# Patient Record
Sex: MALE | Race: WHITE | Employment: FULL TIME | URBAN - METROPOLITAN AREA
[De-identification: names, ages, dates, MRNs, and addresses within clinical notes are randomized per-mention and may not be internally consistent; named-entity substitution may affect disease eponyms.]

---

## 2020-12-30 ENCOUNTER — HOSPITAL ENCOUNTER (INPATIENT)
Age: 60
LOS: 2 days | Discharge: HOME | DRG: 872 | End: 2021-01-01
Attending: EMERGENCY MEDICINE | Admitting: INTERNAL MEDICINE
Payer: COMMERCIAL

## 2020-12-30 ENCOUNTER — APPOINTMENT (OUTPATIENT)
Dept: GENERAL RADIOLOGY | Age: 60
DRG: 872 | End: 2020-12-30
Payer: COMMERCIAL

## 2020-12-30 DIAGNOSIS — A41.9 SEPTICEMIA (HCC): ICD-10-CM

## 2020-12-30 DIAGNOSIS — L03.115 CELLULITIS OF RIGHT LOWER EXTREMITY: Primary | ICD-10-CM

## 2020-12-30 LAB
ABSOLUTE EOS #: 0 K/UL (ref 0–0.44)
ABSOLUTE IMMATURE GRANULOCYTE: 0.16 K/UL (ref 0–0.3)
ABSOLUTE LYMPH #: 0.49 K/UL (ref 1.1–3.7)
ABSOLUTE MONO #: 0.33 K/UL (ref 0.1–1.2)
ALBUMIN SERPL-MCNC: 3.7 G/DL (ref 3.5–5.2)
ALBUMIN/GLOBULIN RATIO: 1.2 (ref 1–2.5)
ALP BLD-CCNC: 81 U/L (ref 40–129)
ALT SERPL-CCNC: 32 U/L (ref 5–41)
ANION GAP SERPL CALCULATED.3IONS-SCNC: 12 MMOL/L (ref 9–17)
AST SERPL-CCNC: 28 U/L
BASOPHILS # BLD: 0 % (ref 0–2)
BASOPHILS ABSOLUTE: 0 K/UL (ref 0–0.2)
BILIRUB SERPL-MCNC: 0.44 MG/DL (ref 0.3–1.2)
BILIRUBIN DIRECT: 0.18 MG/DL
BILIRUBIN URINE: NEGATIVE
BILIRUBIN, INDIRECT: 0.26 MG/DL (ref 0–1)
BUN BLDV-MCNC: 22 MG/DL (ref 6–20)
BUN/CREAT BLD: ABNORMAL (ref 9–20)
C-REACTIVE PROTEIN: 331.9 MG/L (ref 0–5)
CALCIUM SERPL-MCNC: 9.8 MG/DL (ref 8.6–10.4)
CHLORIDE BLD-SCNC: 96 MMOL/L (ref 98–107)
CO2: 25 MMOL/L (ref 20–31)
COLOR: YELLOW
COMMENT UA: NORMAL
CREAT SERPL-MCNC: 1.28 MG/DL (ref 0.7–1.2)
DIFFERENTIAL TYPE: ABNORMAL
EOSINOPHILS RELATIVE PERCENT: 0 % (ref 1–4)
GFR AFRICAN AMERICAN: >60 ML/MIN
GFR NON-AFRICAN AMERICAN: 58 ML/MIN
GFR SERPL CREATININE-BSD FRML MDRD: ABNORMAL ML/MIN/{1.73_M2}
GFR SERPL CREATININE-BSD FRML MDRD: ABNORMAL ML/MIN/{1.73_M2}
GLOBULIN: NORMAL G/DL (ref 1.5–3.8)
GLUCOSE BLD-MCNC: 105 MG/DL (ref 70–99)
GLUCOSE URINE: NEGATIVE
HCT VFR BLD CALC: 46.1 % (ref 40.7–50.3)
HEMOGLOBIN: 14.8 G/DL (ref 13–17)
IMMATURE GRANULOCYTES: 1 %
INR BLD: 1
KETONES, URINE: NEGATIVE
LACTIC ACID, SEPSIS WHOLE BLOOD: 1.4 MMOL/L (ref 0.5–1.9)
LACTIC ACID, SEPSIS WHOLE BLOOD: 1.4 MMOL/L (ref 0.5–1.9)
LACTIC ACID, SEPSIS: NORMAL MMOL/L (ref 0.5–1.9)
LACTIC ACID, SEPSIS: NORMAL MMOL/L (ref 0.5–1.9)
LEUKOCYTE ESTERASE, URINE: NEGATIVE
LYMPHOCYTES # BLD: 3 % (ref 24–43)
MCH RBC QN AUTO: 29.7 PG (ref 25.2–33.5)
MCHC RBC AUTO-ENTMCNC: 32.1 G/DL (ref 28.4–34.8)
MCV RBC AUTO: 92.4 FL (ref 82.6–102.9)
MONOCYTES # BLD: 2 % (ref 3–12)
MORPHOLOGY: NORMAL
NITRITE, URINE: NEGATIVE
NRBC AUTOMATED: 0 PER 100 WBC
PARTIAL THROMBOPLASTIN TIME: 27 SEC (ref 20.5–30.5)
PDW BLD-RTO: 13.2 % (ref 11.8–14.4)
PH UA: 6 (ref 5–8)
PLATELET # BLD: 261 K/UL (ref 138–453)
PLATELET ESTIMATE: ABNORMAL
PMV BLD AUTO: 8.7 FL (ref 8.1–13.5)
POTASSIUM SERPL-SCNC: 4.8 MMOL/L (ref 3.7–5.3)
PROTEIN UA: NEGATIVE
PROTHROMBIN TIME: 10.6 SEC (ref 9–12)
RBC # BLD: 4.99 M/UL (ref 4.21–5.77)
RBC # BLD: ABNORMAL 10*6/UL
SEDIMENTATION RATE, ERYTHROCYTE: 35 MM (ref 0–20)
SEG NEUTROPHILS: 94 % (ref 36–65)
SEGMENTED NEUTROPHILS ABSOLUTE COUNT: 15.42 K/UL (ref 1.5–8.1)
SODIUM BLD-SCNC: 133 MMOL/L (ref 135–144)
SPECIFIC GRAVITY UA: 1.02 (ref 1–1.03)
TOTAL PROTEIN: 6.7 G/DL (ref 6.4–8.3)
TURBIDITY: CLEAR
URINE HGB: NEGATIVE
UROBILINOGEN, URINE: NORMAL
WBC # BLD: 16.4 K/UL (ref 3.5–11.3)
WBC # BLD: ABNORMAL 10*3/UL

## 2020-12-30 PROCEDURE — 6360000002 HC RX W HCPCS: Performed by: STUDENT IN AN ORGANIZED HEALTH CARE EDUCATION/TRAINING PROGRAM

## 2020-12-30 PROCEDURE — 73590 X-RAY EXAM OF LOWER LEG: CPT

## 2020-12-30 PROCEDURE — 2500000003 HC RX 250 WO HCPCS: Performed by: STUDENT IN AN ORGANIZED HEALTH CARE EDUCATION/TRAINING PROGRAM

## 2020-12-30 PROCEDURE — 2580000003 HC RX 258: Performed by: STUDENT IN AN ORGANIZED HEALTH CARE EDUCATION/TRAINING PROGRAM

## 2020-12-30 PROCEDURE — 85652 RBC SED RATE AUTOMATED: CPT

## 2020-12-30 PROCEDURE — 6370000000 HC RX 637 (ALT 250 FOR IP): Performed by: STUDENT IN AN ORGANIZED HEALTH CARE EDUCATION/TRAINING PROGRAM

## 2020-12-30 PROCEDURE — 93970 EXTREMITY STUDY: CPT

## 2020-12-30 PROCEDURE — 99285 EMERGENCY DEPT VISIT HI MDM: CPT

## 2020-12-30 PROCEDURE — 80076 HEPATIC FUNCTION PANEL: CPT

## 2020-12-30 PROCEDURE — 87040 BLOOD CULTURE FOR BACTERIA: CPT

## 2020-12-30 PROCEDURE — 73600 X-RAY EXAM OF ANKLE: CPT

## 2020-12-30 PROCEDURE — 87086 URINE CULTURE/COLONY COUNT: CPT

## 2020-12-30 PROCEDURE — 2060000000 HC ICU INTERMEDIATE R&B

## 2020-12-30 PROCEDURE — 36415 COLL VENOUS BLD VENIPUNCTURE: CPT

## 2020-12-30 PROCEDURE — 81003 URINALYSIS AUTO W/O SCOPE: CPT

## 2020-12-30 PROCEDURE — 86140 C-REACTIVE PROTEIN: CPT

## 2020-12-30 PROCEDURE — 80048 BASIC METABOLIC PNL TOTAL CA: CPT

## 2020-12-30 PROCEDURE — 85610 PROTHROMBIN TIME: CPT

## 2020-12-30 PROCEDURE — 85025 COMPLETE CBC W/AUTO DIFF WBC: CPT

## 2020-12-30 PROCEDURE — 83605 ASSAY OF LACTIC ACID: CPT

## 2020-12-30 PROCEDURE — 85730 THROMBOPLASTIN TIME PARTIAL: CPT

## 2020-12-30 RX ORDER — EFAVIRENZ, EMTRICITABINE AND TENOFOVIR DISOPROXIL FUMARATE 600; 200; 300 MG/1; MG/1; MG/1
1 TABLET, FILM COATED ORAL NIGHTLY
Status: DISCONTINUED | OUTPATIENT
Start: 2020-12-30 | End: 2021-01-01 | Stop reason: HOSPADM

## 2020-12-30 RX ORDER — SODIUM CHLORIDE, SODIUM LACTATE, POTASSIUM CHLORIDE, CALCIUM CHLORIDE 600; 310; 30; 20 MG/100ML; MG/100ML; MG/100ML; MG/100ML
INJECTION, SOLUTION INTRAVENOUS CONTINUOUS
Status: DISCONTINUED | OUTPATIENT
Start: 2020-12-30 | End: 2021-01-01 | Stop reason: HOSPADM

## 2020-12-30 RX ORDER — DOXYCYCLINE HYCLATE 100 MG
100 TABLET ORAL ONCE
Status: COMPLETED | OUTPATIENT
Start: 2020-12-30 | End: 2020-12-30

## 2020-12-30 RX ORDER — EFAVIRENZ, EMTRICITABINE AND TENOFOVIR DISOPROXIL FUMARATE 600; 200; 300 MG/1; MG/1; MG/1
1 TABLET, FILM COATED ORAL NIGHTLY
COMMUNITY

## 2020-12-30 RX ORDER — PROMETHAZINE HYDROCHLORIDE 12.5 MG/1
12.5 TABLET ORAL EVERY 6 HOURS PRN
Status: DISCONTINUED | OUTPATIENT
Start: 2020-12-30 | End: 2021-01-01 | Stop reason: HOSPADM

## 2020-12-30 RX ORDER — POLYETHYLENE GLYCOL 3350 17 G/17G
17 POWDER, FOR SOLUTION ORAL DAILY PRN
Status: DISCONTINUED | OUTPATIENT
Start: 2020-12-30 | End: 2021-01-01 | Stop reason: HOSPADM

## 2020-12-30 RX ORDER — HEPARIN SODIUM 5000 [USP'U]/ML
5000 INJECTION, SOLUTION INTRAVENOUS; SUBCUTANEOUS EVERY 8 HOURS SCHEDULED
Status: DISCONTINUED | OUTPATIENT
Start: 2020-12-30 | End: 2021-01-01

## 2020-12-30 RX ORDER — AMLODIPINE BESYLATE 5 MG/1
5 TABLET ORAL DAILY
Status: DISCONTINUED | OUTPATIENT
Start: 2020-12-31 | End: 2021-01-01 | Stop reason: HOSPADM

## 2020-12-30 RX ORDER — OXYCODONE HYDROCHLORIDE AND ACETAMINOPHEN 5; 325 MG/1; MG/1
1 TABLET ORAL ONCE
Status: COMPLETED | OUTPATIENT
Start: 2020-12-30 | End: 2020-12-30

## 2020-12-30 RX ORDER — CLINDAMYCIN PHOSPHATE 600 MG/50ML
600 INJECTION INTRAVENOUS ONCE
Status: COMPLETED | OUTPATIENT
Start: 2020-12-30 | End: 2020-12-30

## 2020-12-30 RX ORDER — METOPROLOL TARTRATE 5 MG/5ML
5 INJECTION INTRAVENOUS ONCE
Status: COMPLETED | OUTPATIENT
Start: 2020-12-30 | End: 2020-12-30

## 2020-12-30 RX ORDER — METOPROLOL TARTRATE 50 MG/1
50 TABLET, FILM COATED ORAL 2 TIMES DAILY
COMMUNITY

## 2020-12-30 RX ORDER — ONDANSETRON 2 MG/ML
4 INJECTION INTRAMUSCULAR; INTRAVENOUS EVERY 6 HOURS PRN
Status: DISCONTINUED | OUTPATIENT
Start: 2020-12-30 | End: 2021-01-01 | Stop reason: HOSPADM

## 2020-12-30 RX ORDER — AMLODIPINE BESYLATE 5 MG/1
5 TABLET ORAL DAILY
COMMUNITY

## 2020-12-30 RX ORDER — SODIUM CHLORIDE 0.9 % (FLUSH) 0.9 %
10 SYRINGE (ML) INJECTION EVERY 12 HOURS SCHEDULED
Status: DISCONTINUED | OUTPATIENT
Start: 2020-12-30 | End: 2021-01-01 | Stop reason: HOSPADM

## 2020-12-30 RX ORDER — SODIUM CHLORIDE 0.9 % (FLUSH) 0.9 %
10 SYRINGE (ML) INJECTION PRN
Status: DISCONTINUED | OUTPATIENT
Start: 2020-12-30 | End: 2021-01-01 | Stop reason: HOSPADM

## 2020-12-30 RX ORDER — ATORVASTATIN CALCIUM 10 MG/1
10 TABLET, FILM COATED ORAL DAILY
Status: DISCONTINUED | OUTPATIENT
Start: 2020-12-31 | End: 2021-01-01 | Stop reason: HOSPADM

## 2020-12-30 RX ORDER — ACETAMINOPHEN 325 MG/1
650 TABLET ORAL EVERY 6 HOURS PRN
Status: DISCONTINUED | OUTPATIENT
Start: 2020-12-30 | End: 2021-01-01 | Stop reason: HOSPADM

## 2020-12-30 RX ORDER — CLINDAMYCIN PHOSPHATE 600 MG/50ML
600 INJECTION INTRAVENOUS EVERY 8 HOURS
Status: DISCONTINUED | OUTPATIENT
Start: 2020-12-30 | End: 2021-01-01 | Stop reason: HOSPADM

## 2020-12-30 RX ORDER — ACETAMINOPHEN 650 MG/1
650 SUPPOSITORY RECTAL EVERY 6 HOURS PRN
Status: DISCONTINUED | OUTPATIENT
Start: 2020-12-30 | End: 2021-01-01 | Stop reason: HOSPADM

## 2020-12-30 RX ADMIN — METOPROLOL TARTRATE 5 MG: 5 INJECTION, SOLUTION INTRAVENOUS at 18:28

## 2020-12-30 RX ADMIN — SODIUM CHLORIDE, POTASSIUM CHLORIDE, SODIUM LACTATE AND CALCIUM CHLORIDE: 600; 310; 30; 20 INJECTION, SOLUTION INTRAVENOUS at 11:42

## 2020-12-30 RX ADMIN — CLINDAMYCIN PHOSPHATE 600 MG: 600 INJECTION, SOLUTION INTRAVENOUS at 08:42

## 2020-12-30 RX ADMIN — ACETAMINOPHEN 650 MG: 325 TABLET ORAL at 18:04

## 2020-12-30 RX ADMIN — HEPARIN SODIUM 5000 UNITS: 5000 INJECTION INTRAVENOUS; SUBCUTANEOUS at 22:16

## 2020-12-30 RX ADMIN — EFAVIRENZ, EMTRICITABINE, AND TENOFOVIR DISOPROXIL FUMARATE 1 TABLET: 600; 200; 300 TABLET, FILM COATED ORAL at 22:16

## 2020-12-30 RX ADMIN — OXYCODONE HYDROCHLORIDE AND ACETAMINOPHEN 1 TABLET: 5; 325 TABLET ORAL at 06:50

## 2020-12-30 RX ADMIN — SODIUM CHLORIDE, PRESERVATIVE FREE 10 ML: 5 INJECTION INTRAVENOUS at 11:44

## 2020-12-30 RX ADMIN — HEPARIN SODIUM 5000 UNITS: 5000 INJECTION INTRAVENOUS; SUBCUTANEOUS at 16:15

## 2020-12-30 RX ADMIN — SODIUM CHLORIDE, POTASSIUM CHLORIDE, SODIUM LACTATE AND CALCIUM CHLORIDE: 600; 310; 30; 20 INJECTION, SOLUTION INTRAVENOUS at 21:00

## 2020-12-30 RX ADMIN — CLINDAMYCIN IN 5 PERCENT DEXTROSE 600 MG: 12 INJECTION, SOLUTION INTRAVENOUS at 14:01

## 2020-12-30 RX ADMIN — CLINDAMYCIN IN 5 PERCENT DEXTROSE 600 MG: 12 INJECTION, SOLUTION INTRAVENOUS at 21:31

## 2020-12-30 RX ADMIN — VANCOMYCIN HYDROCHLORIDE 1750 MG: 10 INJECTION, POWDER, LYOPHILIZED, FOR SOLUTION INTRAVENOUS at 10:45

## 2020-12-30 RX ADMIN — DOXYCYCLINE HYCLATE 100 MG: 100 TABLET, COATED ORAL at 06:35

## 2020-12-30 RX ADMIN — SODIUM CHLORIDE, PRESERVATIVE FREE 10 ML: 5 INJECTION INTRAVENOUS at 21:31

## 2020-12-30 ASSESSMENT — PAIN SCALES - GENERAL
PAINLEVEL_OUTOF10: 0
PAINLEVEL_OUTOF10: 0
PAINLEVEL_OUTOF10: 7
PAINLEVEL_OUTOF10: 4

## 2020-12-30 ASSESSMENT — PAIN DESCRIPTION - PAIN TYPE: TYPE: ACUTE PAIN

## 2020-12-30 ASSESSMENT — ENCOUNTER SYMPTOMS
ABDOMINAL PAIN: 0
WHEEZING: 0
SHORTNESS OF BREATH: 0
VOMITING: 0
COUGH: 0
CHEST TIGHTNESS: 0
NAUSEA: 0

## 2020-12-30 ASSESSMENT — PAIN DESCRIPTION - LOCATION: LOCATION: LEG

## 2020-12-30 NOTE — ED PROVIDER NOTES
[facility]  ED  Emergency Department Encounter  Emergency Medicine Resident     Pt Name: Phil Muller  MRN: [id number]  [place] 1960   Date of evaluation: 12/30/20  PCP:  Paulo Lozada MD    [address] [place] [address]       Chief Complaint   Patient presents with    Leg Swelling     RLE/ redness to RLE       HISTORY OFPRESENT ILLNESS  (Location/Symptom, Timing/Onset, Context/Setting, Quality, Duration, Modifying [place].)      Phil Muller is a 63 yo male who presents with right leg swelling, redness, warmth. He notes a history of cellulitis in that leg before. He states that his legs been swollen for the past day or so. Denies any fevers, chills, sweats, cough or difficulty breathing, abdominal pain, nausea, vomiting. He denies any history of blood clots in the past, hemoptysis, recent surgery or long travel. Denies any history of diabetes or abrasions or cuts to the leg. He notes it is painful and hard and the back of his calf. PAST MEDICAL / SURGICAL / SOCIAL / FAMILY HISTORY      has a past medical history of HIV disease ([address] and Hypertension. Cellulitis     has no past surgical history on file.  Denies    Social History     Socioeconomic History    Marital status: Single     Spouse name: Not on file    Number of children: Not on file    Years of education: Not on file    Highest education level: Not on file   Occupational History    Not on file   Social Needs    Financial resource strain: Not on file    Food insecurity     Worry: Not on file     Inability: Not on file    Transportation needs     Medical: Not on file     Non-medical: Not on file   Tobacco Use    Smoking status: Never Smoker    Smokeless tobacco: Never Used   Substance and Sexual Activity    Alcohol use: Not on file     Comment: socially    Drug use: Never    Sexual activity: Not on file   Lifestyle    Physical activity     Days per week: Not on file     Minutes per session: Not on file    Stress: Not on file   Relationships    Social connections     Talks on phone: Not on file     Gets together: Not on file     Attends [employer] service: Not on file     Active member of club or organization: Not on file     Attends meetings of clubs or organizations: Not on file     Relationship status: Not on file    Intimate partner violence     Fear of current or ex partner: Not on file     Emotionally abused: Not on file     Physically abused: Not on file     Forced sexual activity: Not on file   Other Topics Concern    Not on file   Social History Narrative    Not on file       History reviewed. No pertinent family history. Allergies:  Patient has no known allergies. Home Medications:  Prior to Admission medications    Medication Sig Start Date End Date Taking? Authorizing Provider   LOVASTATIN PO Take by mouth   Yes Historical Provider, MD       REVIEW OFSYSTEMS    (2-9 systems for level 4, 10 or more for level 5)      Review of Systems   Constitutional: Negative for chills and fever. HENT: Negative. Eyes: Negative for visual disturbance. Respiratory: Negative for cough, chest tightness, shortness of breath and wheezing. Cardiovascular: Negative for chest pain and palpitations. Right leg swelling. Gastrointestinal: Negative for abdominal pain, nausea and vomiting. Skin: Positive for rash. Negative for wound. Neurological: Negative for syncope, weakness, light-headedness and numbness. PHYSICAL EXAM   (up to 7 for level 4, 8 or more forlevel 5)      INITIAL VITALS:   ED Triage Vitals   BP Temp Temp Source Pulse Resp SpO2 Height Weight   12/30/20 0628 12/30/20 0620 12/30/20 0620 12/30/20 0628 12/30/20 0628 12/30/20 0628 -- --   138/79 98.2 °F (36.8 °C) Temporal 98 20 100 %         Physical Exam  Vitals signs and nursing note reviewed. Constitutional:       General: He is not in acute distress. Appearance: Normal appearance.  He is not ill-appearing, toxic-appearing or diaphoretic. Cardiovascular:      Rate and Rhythm: Regular rhythm. Tachycardia present. Pulses: Normal pulses. Heart sounds: No murmur. No gallop. Pulmonary:      Effort: Pulmonary effort is normal.      Breath sounds: Normal breath sounds. Abdominal:      General: There is no distension. Palpations: Abdomen is soft. Tenderness: There is no abdominal tenderness. There is no guarding. Musculoskeletal:      Comments: Left leg normal.  Right lower extremity with swelling, erythema, warmth calf and popliteal tenderness with a tight but compressible compartment. Neurological:      Mental Status: He is alert. Comments: Right lower extremity is neurovascular intact.          DIFFERENTIAL  DIAGNOSIS     PLAN (LABS / IMAGING / EKG):  Orders Placed This Encounter   Procedures    Culture, Blood 1    Culture, Blood 1    Culture, Blood 1    Culture, Blood 2    Culture, Urine    XR ANKLE RIGHT (2 VIEWS)    XR TIBIA FIBULA RIGHT (2 VIEWS)    VL DUP LOWER EXTREMITY VENOUS BILATERAL    CBC WITH AUTO DIFFERENTIAL    BASIC METABOLIC PANEL    C-REACTIVE PROTEIN    Sedimentation Rate    Lactate, Sepsis    Protime-INR    APTT    HEPATIC FUNCTION PANEL    Basic Metabolic Panel w/ Reflex to MG    CBC auto differential    URINALYSIS    DIET GENERAL;    Height and weight    Telemetry Monitoring    Vital signs per unit routine    Notify physician    Up as tolerated    Daily weights    Intake and output    Elevate Head of Bed     Strict intake and output    Misc nursing order (specify)    Full Code    Inpatient consult to Internal Medicine    Inpatient consult to Case Management    OT eval and treat    PT evaluation and treat    Initiate Oxygen Therapy Protocol    PATIENT STATUS (FROM ED OR OR/PROCEDURAL) Inpatient       MEDICATIONS ORDERED:  Orders Placed This Encounter   Medications    doxycycline hyclate (VIBRA-TABS) tablet 100 mg     Order Specific Question: Antimicrobial Indications     Answer: Other     Order Specific Question:   Other Abx Indication     Answer:   cellulitis    oxyCODONE-acetaminophen (PERCOCET) 5-325 MG per tablet 1 tablet    DISCONTD: vancomycin (VANCOCIN) 1750 mg in dextrose 5% 500 mL IVPB     Order Specific Question:   Antimicrobial Indications     Answer: Other     Order Specific Question:   Other Abx Indication     Answer:   Cellulitis    clindamycin (CLEOCIN) 600 mg in dextrose 5 % 50 mL IVPB     Order Specific Question:   Antimicrobial Indications     Answer: Other     Order Specific Question:   Other Abx Indication     Answer:   Cellulitis    sodium chloride flush 0.9 % injection 10 mL    sodium chloride flush 0.9 % injection 10 mL    OR Linked Order Group     promethazine (PHENERGAN) tablet 12.5 mg     ondansetron (ZOFRAN) injection 4 mg    polyethylene glycol (GLYCOLAX) packet 17 g    OR Linked Order Group     acetaminophen (TYLENOL) tablet 650 mg     acetaminophen (TYLENOL) suppository 650 mg    heparin (porcine) injection 5,000 Units    clindamycin (CLEOCIN) 600 mg in dextrose 5 % 50 mL IVPB     Order Specific Question:   Antimicrobial Indications     Answer:   Skin and Soft Tissue Infection    lactated ringers infusion       DDX: DVT, cellulitis, sepsis    Initial MDM/Plan/ED course: 62 y.o. male who presents with right lower extremity swelling, redness, warmth. On exam patient is mildly tachycardic otherwise vitals normal patient is in no acute distress. Physical exam reveals a erythematous, warm, swollen and tender right lower extremity up to the knee. There is induration and firmness of the calf however the compartment is still compressible. Basic labs as well as CRP and ESR were obtained. Plain films of the leg and venous Doppler studies of the leg were obtained. Lab work showing leukocytosis and elevation in inflammatory markers consistent with cellulitis.   Patient does meet criteria for sepsis and would like to stay for IV antibiotics at this time. He does note a history of HIV but states that his viral load has been undetectable for the past few years. Patient started on IV antibiotics and admitted to internal medicine. DIAGNOSTIC RESULTS / EMERGENCY DEPARTMENT COURSE / MDM     LABS:  Labs Reviewed   CBC WITH AUTO DIFFERENTIAL - Abnormal; Notable for the following components:       Result Value    WBC 16.4 (*)     Immature Granulocytes 1 (*)     Seg Neutrophils 94 (*)     Lymphocytes 3 (*)     Monocytes 2 (*)     Eosinophils % 0 (*)     Segs Absolute 15.42 (*)     Absolute Lymph # 0.49 (*)     All other components within normal limits   BASIC METABOLIC PANEL - Abnormal; Notable for the following components:    Glucose 105 (*)     BUN 22 (*)     CREATININE 1.28 (*)     Sodium 133 (*)     Chloride 96 (*)     GFR Non- 58 (*)     All other components within normal limits   C-REACTIVE PROTEIN - Abnormal; Notable for the following components:    .9 (*)     All other components within normal limits   SEDIMENTATION RATE - Abnormal; Notable for the following components:    Sed Rate 35 (*)     All other components within normal limits   CULTURE, BLOOD 1   CULTURE, BLOOD 1   CULTURE, BLOOD 1   CULTURE, BLOOD 2   CULTURE, URINE   LACTATE, SEPSIS   LACTATE, SEPSIS   PROTIME-INR   APTT   HEPATIC FUNCTION PANEL   URINALYSIS         RADIOLOGY:  Xr Tibia Fibula Right (2 Views)    Result Date: 12/30/2020  EXAMINATION: 2 XRAY VIEWS OF THE RIGHT ANKLE; 2 XRAY VIEWS OF THE RIGHT TIBIA AND FIBULA 12/30/2020 7:05 am COMPARISON: None. HISTORY: ORDERING SYSTEM PROVIDED HISTORY: poss infection TECHNOLOGIST PROVIDED HISTORY: poss infection Reason for Exam: c/o swelling, redness right foot & lower leg x couple days; denies injury hx prior cellulitis Type of Exam: Initial FINDINGS: There is normal alignment of the bones at the knee and ankle. Bone mineralization normal.  No acute fracture.   No abnormal periosteal reaction. No lytic or blastic lesions. There is a diffuse soft tissue edema of the lower leg. This can be seen in cellulitis. Please correlate with physical exam.     No radiographic evidence for acute osseous abnormality or infection. Diffuse edema of the lower lytic suggestive of cellulitis. Please correlate with physical exam.     Xr Ankle Right (2 Views)    Result Date: 12/30/2020  EXAMINATION: 2 XRAY VIEWS OF THE RIGHT ANKLE; 2 XRAY VIEWS OF THE RIGHT TIBIA AND FIBULA 12/30/2020 7:05 am COMPARISON: None. HISTORY: ORDERING SYSTEM PROVIDED HISTORY: poss infection TECHNOLOGIST PROVIDED HISTORY: poss infection Reason for Exam: c/o swelling, redness right foot & lower leg x couple days; denies injury hx prior cellulitis Type of Exam: Initial FINDINGS: There is normal alignment of the bones at the knee and ankle. Bone mineralization normal.  No acute fracture. No abnormal periosteal reaction. No lytic or blastic lesions. There is a diffuse soft tissue edema of the lower leg. This can be seen in cellulitis. Please correlate with physical exam.     No radiographic evidence for acute osseous abnormality or infection. Diffuse edema of the lower lytic suggestive of cellulitis. Please correlate with physical exam.         EKG      All EKG's are interpreted by the [facility] Physician who either signs or Co-signs this chart in the absence of a cardiologist.      PROCEDURES:  None    CONSULTS:  IP CONSULT TO INTERNAL MEDICINE  IP CONSULT TO CASE MANAGEMENT    CRITICAL CARE:  Please see attending note    FINAL IMPRESSION      1. Cellulitis of right lower extremity    2. Septicemia ([address]          DISPOSITION / PLAN     DISPOSITION Admitted 12/30/2020 08:33:28 AM      PATIENT REFERRED TO:  No follow-up provider specified.     DISCHARGE MEDICATIONS:  New Prescriptions    No medications on file       Iris Johnson DO  Emergency Medicine Resident    (Please note that portions of this note were completed with a voice recognition program.Efforts were made to edit the dictations but occasionally words are mis-transcribed.)        Ghassan Zamora DO  Resident  12/30/20 2376

## 2020-12-30 NOTE — ED NOTES
Pt transported to vascular lab     Danilo Mindy, [address] [place] [address]  12/30/20 7245

## 2020-12-30 NOTE — CARE COORDINATION
Case Management Initial Discharge Plan  Ayad Klein,             Met with:patient to discuss discharge plans. Information verified: address, contacts, phone number, , insurance Yes    Emergency Contact/Next of Kin name & number:   Marija Fernandez    Other    [phone]         PCP: Anabella Price MD  Date of last visit: 3 months ago    Insurance Provider: [employer]    Discharge Planning    Living Arrangements:  Friends   Support Systems:  Friends/Neighbors    Home has 2 stories  3 stairs to climb to get into front door, 15stairs to climb to reach second floor  Location of bedroom/bathroom in home main    Patient able to perform ADL's:Independent    Current Services (outpatient & in home) none  DME equipment: none  DME provider: none    Receiving oral anticoagulation therapy? No    If indicated:   Physician managing anticoagulation treatment: n/a  Where does patient obtain lab work for ATC treatment? n/a      Potential Assistance Needed:  N/A    Patient agreeable to home care: No  [place] of choice provided:  n/a    Prior SNF/Rehab Placement and Facility: none  Agreeable to SNF/Rehab: No  [place] of choice provided: n/a     Evaluation: no    Expected Discharge date:       Patient expects to be discharged to:  home  Follow Up Appointment: Best Day/ Time:      Transportation provider: self  Transportation arrangements needed for discharge: No    Readmission Risk              Risk of Unplanned Readmission:        7             Does patient have a readmission risk score greater than 14?: No  If yes, follow-up appointment must be made within 7 days of discharge.      Goals of Care: to go home      Discharge Plan: Home independently pcp established has [facility]          Electronically signed by Julián Glez RN on 20 at 9:26 AM EST

## 2020-12-30 NOTE — ED PROVIDER NOTES
[facility]     Emergency Department     Faculty Attestation    I performed a history and physical examination of the patient and discussed management with the resident. I have reviewed and agree with the residents findings including all diagnostic interpretations, and treatment plans as written. Any areas of disagreement are noted on the chart. I was personally present for the key portions of any procedures. I have documented in the chart those procedures where I was not present during the key portions. I have reviewed the emergency nurses triage note. I agree with the chief complaint, past medical history, past surgical history, allergies, medications, social and family history as documented unless otherwise noted below. Documentation of the HPI, Physical Exam and Medical Decision Making performed by maiibдмитрий is based on my personal performance of the HPI, PE and MDM. For Physician Assistant/ Nurse Practitioner cases/documentation I have personally evaluated this patient and have completed at least one if not all key elements of the E/M (history, physical exam, and MDM). Additional findings are as noted. 63 yo M r leg swelling redness, no fever, no vomit, no cp or sob,   pe vss gcs 15, 3+ edema r calf / shin / foot, + calf tenderness, compartments firm, warm to touch,     Lab / xr / venous doppler ordered, > care turned over to day shift    EKG Interpretation    Interpreted by me      CRITICAL CARE: There was a high probability of clinically significant/life threatening deterioration in this patient's condition which required my urgent intervention. Total critical care time was 5 minutes. This excludes any time for separately reportable procedures.        Bethany 24, DO  12/30/20 17 Ethel Pierce, DO  12/30/20 0147

## 2020-12-30 NOTE — ED NOTES
Pt resting comfortably in bed. IV antibiotics started. Pt denies any complaints or needs at this time.       Malaika Ortiz RN  12/30/20 8394

## 2020-12-30 NOTE — ED NOTES
Resident notified of pt heart rate increase and temperature increase.       Missael Gibson RN  12/30/20 7975

## 2020-12-30 NOTE — ED PROVIDER NOTES
[facility]  FACULTY HANDOFF       Handoff taken on the following patient from prior Attending Physician: Dr. Mehdi Flores  Pt Name: Brijesh Venegas  PCP:  Francisco Ann MD    Attestation  I was available and discussed any additional care issues that arose and coordinated the management plans with the resident(s) caring for the patient during my duty period. Any areas of disagreement with resident's documentation of care or procedures are noted on the chart. I was personally present for the key portions of any/all procedures during my duty period. I have documented in the chart those procedures where I was not present during the key portions. CHIEF COMPLAINT       Chief Complaint   Patient presents with    Leg Swelling     RLE/ redness to RLE         CURRENT MEDICATIONS     Previous Medications  Previous Medications    No medications on file       Encounter Medications  Orders Placed This Encounter   Medications    doxycycline hyclate (VIBRA-TABS) tablet 100 mg     Order Specific Question:   Antimicrobial Indications     Answer: Other     Order Specific Question:   Other Abx Indication     Answer:   cellulitis    oxyCODONE-acetaminophen (PERCOCET) 5-325 MG per tablet 1 tablet    vancomycin (VANCOCIN) 15 mg/kg in dextrose 5 % 250 mL IVPB     Order Specific Question:   Antimicrobial Indications     Answer: Other     Order Specific Question:   Other Abx Indication     Answer:   Cellulitis    clindamycin (CLEOCIN) 600 mg in dextrose 5 % 50 mL IVPB     Order Specific Question:   Antimicrobial Indications     Answer: Other     Order Specific Question:   Other Abx Indication     Answer:   Cellulitis       ALLERGIES     has No Known Allergies. RECENT VITALS:   Temp: 98.2 °F (36.8 °C),  Pulse: 98, Resp: 20, BP: 138/79    RADIOLOGY:   XR ANKLE RIGHT (2 VIEWS)   Final Result   No radiographic evidence for acute osseous abnormality or infection.   Diffuse   edema of the lower lytic suggestive of cellulitis. Please correlate with   physical exam.         XR TIBIA FIBULA RIGHT (2 VIEWS)   Final Result   No radiographic evidence for acute osseous abnormality or infection. Diffuse   edema of the lower lytic suggestive of cellulitis. Please correlate with   physical exam.         VL DUP LOWER EXTREMITY VENOUS BILATERAL    (Results Pending)       LABS:  Labs Reviewed   CBC WITH AUTO DIFFERENTIAL - Abnormal; Notable for the following components:       Result Value    WBC 16.4 (*)     Immature Granulocytes 1 (*)     Seg Neutrophils 94 (*)     Lymphocytes 3 (*)     Monocytes 2 (*)     Eosinophils % 0 (*)     Segs Absolute 15.42 (*)     Absolute Lymph # 0.49 (*)     All other components within normal limits   BASIC METABOLIC PANEL - Abnormal; Notable for the following components:    Glucose 105 (*)     BUN 22 (*)     CREATININE 1.28 (*)     Sodium 133 (*)     Chloride 96 (*)     GFR Non- 58 (*)     All other components within normal limits   C-REACTIVE PROTEIN - Abnormal; Notable for the following components:    .9 (*)     All other components within normal limits   SEDIMENTATION RATE - Abnormal; Notable for the following components:    Sed Rate 35 (*)     All other components within normal limits   CULTURE, BLOOD 1   CULTURE, BLOOD 1   CULTURE, URINE   LACTATE, SEPSIS   LACTATE, SEPSIS   PROTIME-INR   APTT   URINALYSIS WITH MICROSCOPIC   HEPATIC FUNCTION PANEL   POC BLOOD GAS AND CHEMISTRY           PLAN/ TASKS OUTSTANDING     Pt with leg swelling and cellulitis. Pt has had this in the past. Plan for labs, dvt screening. Admit if labs concerning for IV antibiotics.        (Please note that portions of this note were completed with a voice recognition program.  Efforts were made to edit the dictations but occasionally words are mis-transcribed.)    Elidia Pettit MD,   Attending Emergency Physician       Elidia Pettit MD  12/30/20 5409

## 2020-12-30 NOTE — LETTER
[address] [address] [address] [place] [address]  Phone: [phone]    No name on file. January 1, 2021     Patient: Kieth Cardenas   YOB: 1960   Date of Visit: 12/30/2020       To Whom it May Concern:    Keith Cardenas was seen in our hospital on 12/30/2020. He may return to work on monday(12/04/2020). If you have any questions or concerns, please don't hesitate to call.     Sincerely,   Ana Peña  Internal Medicine Resident  [facility]  1/1/2021 1:24 PM

## 2020-12-30 NOTE — LETTER
[address]  [address] [address]  Phone: [phone]    No name on file. January 1, 2021     Patient: Shantell Bartlett   YOB: 1960   Date of Visit: 12/30/2020       To Whom it May Concern:    Shantell Bartlett was seen in my clinic on 12/30/2020. He may return to work on 12/04/2020. If you have any questions or concerns, please don't hesitate to call.     Sincerely,   Karon Reyes  Internal Medicine Resident, PGY [facility]  1/1/2021 1:30 PM

## 2020-12-30 NOTE — ED NOTES
Patient arrived to ED alert and oriented x4  Patient has complaints of RLE swelling/ redness- Patient states this started yesterday afternoon  Patient states 2 years ago he had something similar happen and it was cellulitis  Patient states he has constant pain rated 7/10, states pain is worse when walking on leg  Dr. Jesusita Nava at bedside      [facility]  12/30/20 9477

## 2020-12-31 PROBLEM — E66.01 MORBID OBESITY DUE TO EXCESS CALORIES (HCC): Status: ACTIVE | Noted: 2020-12-31

## 2020-12-31 PROBLEM — L03.115 CELLULITIS OF RIGHT LEG: Status: ACTIVE | Noted: 2020-12-31

## 2020-12-31 LAB
ABSOLUTE EOS #: <0.03 K/UL (ref 0–0.44)
ABSOLUTE IMMATURE GRANULOCYTE: 0.1 K/UL (ref 0–0.3)
ABSOLUTE LYMPH #: 0.71 K/UL (ref 1.1–3.7)
ABSOLUTE MONO #: 0.63 K/UL (ref 0.1–1.2)
ANION GAP SERPL CALCULATED.3IONS-SCNC: 11 MMOL/L (ref 9–17)
BASOPHILS # BLD: 0 % (ref 0–2)
BASOPHILS ABSOLUTE: <0.03 K/UL (ref 0–0.2)
BUN BLDV-MCNC: 13 MG/DL (ref 6–20)
BUN/CREAT BLD: ABNORMAL (ref 9–20)
CALCIUM SERPL-MCNC: 9.1 MG/DL (ref 8.6–10.4)
CHLORIDE BLD-SCNC: 100 MMOL/L (ref 98–107)
CO2: 25 MMOL/L (ref 20–31)
CREAT SERPL-MCNC: 0.89 MG/DL (ref 0.7–1.2)
CULTURE: NO GROWTH
DIFFERENTIAL TYPE: ABNORMAL
EOSINOPHILS RELATIVE PERCENT: 0 % (ref 1–4)
GFR AFRICAN AMERICAN: >60 ML/MIN
GFR NON-AFRICAN AMERICAN: >60 ML/MIN
GFR SERPL CREATININE-BSD FRML MDRD: ABNORMAL ML/MIN/{1.73_M2}
GFR SERPL CREATININE-BSD FRML MDRD: ABNORMAL ML/MIN/{1.73_M2}
GLUCOSE BLD-MCNC: 106 MG/DL (ref 70–99)
HCT VFR BLD CALC: 42.1 % (ref 40.7–50.3)
HEMOGLOBIN: 13 G/DL (ref 13–17)
IMMATURE GRANULOCYTES: 1 %
LYMPHOCYTES # BLD: 6 % (ref 24–43)
Lab: NORMAL
MCH RBC QN AUTO: 29.3 PG (ref 25.2–33.5)
MCHC RBC AUTO-ENTMCNC: 30.9 G/DL (ref 28.4–34.8)
MCV RBC AUTO: 95 FL (ref 82.6–102.9)
MONOCYTES # BLD: 5 % (ref 3–12)
NRBC AUTOMATED: 0 PER 100 WBC
PDW BLD-RTO: 13.5 % (ref 11.8–14.4)
PLATELET # BLD: 210 K/UL (ref 138–453)
PLATELET ESTIMATE: ABNORMAL
PMV BLD AUTO: 8.8 FL (ref 8.1–13.5)
POTASSIUM SERPL-SCNC: 4.4 MMOL/L (ref 3.7–5.3)
RBC # BLD: 4.43 M/UL (ref 4.21–5.77)
RBC # BLD: ABNORMAL 10*6/UL
SEG NEUTROPHILS: 88 % (ref 36–65)
SEGMENTED NEUTROPHILS ABSOLUTE COUNT: 11.14 K/UL (ref 1.5–8.1)
SODIUM BLD-SCNC: 136 MMOL/L (ref 135–144)
SPECIMEN DESCRIPTION: NORMAL
WBC # BLD: 12.6 K/UL (ref 3.5–11.3)
WBC # BLD: ABNORMAL 10*3/UL

## 2020-12-31 PROCEDURE — 97166 OT EVAL MOD COMPLEX 45 MIN: CPT

## 2020-12-31 PROCEDURE — 85025 COMPLETE CBC W/AUTO DIFF WBC: CPT

## 2020-12-31 PROCEDURE — 6360000002 HC RX W HCPCS: Performed by: STUDENT IN AN ORGANIZED HEALTH CARE EDUCATION/TRAINING PROGRAM

## 2020-12-31 PROCEDURE — 2580000003 HC RX 258: Performed by: STUDENT IN AN ORGANIZED HEALTH CARE EDUCATION/TRAINING PROGRAM

## 2020-12-31 PROCEDURE — 97162 PT EVAL MOD COMPLEX 30 MIN: CPT

## 2020-12-31 PROCEDURE — 2500000003 HC RX 250 WO HCPCS: Performed by: STUDENT IN AN ORGANIZED HEALTH CARE EDUCATION/TRAINING PROGRAM

## 2020-12-31 PROCEDURE — 80048 BASIC METABOLIC PNL TOTAL CA: CPT

## 2020-12-31 PROCEDURE — 99223 1ST HOSP IP/OBS HIGH 75: CPT | Performed by: INTERNAL MEDICINE

## 2020-12-31 PROCEDURE — 6370000000 HC RX 637 (ALT 250 FOR IP): Performed by: STUDENT IN AN ORGANIZED HEALTH CARE EDUCATION/TRAINING PROGRAM

## 2020-12-31 PROCEDURE — 97530 THERAPEUTIC ACTIVITIES: CPT

## 2020-12-31 PROCEDURE — 94660 CPAP INITIATION&MGMT: CPT

## 2020-12-31 PROCEDURE — 2060000000 HC ICU INTERMEDIATE R&B

## 2020-12-31 PROCEDURE — 97535 SELF CARE MNGMENT TRAINING: CPT

## 2020-12-31 PROCEDURE — 36415 COLL VENOUS BLD VENIPUNCTURE: CPT

## 2020-12-31 RX ADMIN — ACETAMINOPHEN 650 MG: 325 TABLET ORAL at 12:25

## 2020-12-31 RX ADMIN — CLINDAMYCIN IN 5 PERCENT DEXTROSE 600 MG: 12 INJECTION, SOLUTION INTRAVENOUS at 15:05

## 2020-12-31 RX ADMIN — CLINDAMYCIN IN 5 PERCENT DEXTROSE 600 MG: 12 INJECTION, SOLUTION INTRAVENOUS at 05:49

## 2020-12-31 RX ADMIN — SODIUM CHLORIDE, PRESERVATIVE FREE 10 ML: 5 INJECTION INTRAVENOUS at 08:04

## 2020-12-31 RX ADMIN — HEPARIN SODIUM 5000 UNITS: 5000 INJECTION INTRAVENOUS; SUBCUTANEOUS at 21:08

## 2020-12-31 RX ADMIN — METOPROLOL TARTRATE 25 MG: 25 TABLET ORAL at 04:23

## 2020-12-31 RX ADMIN — ATORVASTATIN CALCIUM 10 MG: 10 TABLET, FILM COATED ORAL at 08:03

## 2020-12-31 RX ADMIN — EFAVIRENZ, EMTRICITABINE, AND TENOFOVIR DISOPROXIL FUMARATE 1 TABLET: 600; 200; 300 TABLET, FILM COATED ORAL at 21:08

## 2020-12-31 RX ADMIN — HEPARIN SODIUM 5000 UNITS: 5000 INJECTION INTRAVENOUS; SUBCUTANEOUS at 05:50

## 2020-12-31 RX ADMIN — HEPARIN SODIUM 5000 UNITS: 5000 INJECTION INTRAVENOUS; SUBCUTANEOUS at 15:04

## 2020-12-31 RX ADMIN — ACETAMINOPHEN 650 MG: 325 TABLET ORAL at 07:08

## 2020-12-31 RX ADMIN — CLINDAMYCIN IN 5 PERCENT DEXTROSE 600 MG: 12 INJECTION, SOLUTION INTRAVENOUS at 21:08

## 2020-12-31 RX ADMIN — ACETAMINOPHEN 650 MG: 325 TABLET ORAL at 21:08

## 2020-12-31 RX ADMIN — AMLODIPINE BESYLATE 5 MG: 5 TABLET ORAL at 08:03

## 2020-12-31 RX ADMIN — SODIUM CHLORIDE, PRESERVATIVE FREE 10 ML: 5 INJECTION INTRAVENOUS at 21:08

## 2020-12-31 RX ADMIN — ACETAMINOPHEN 650 MG: 325 TABLET ORAL at 00:13

## 2020-12-31 ASSESSMENT — PAIN DESCRIPTION - LOCATION: LOCATION: LEG

## 2020-12-31 ASSESSMENT — PAIN SCALES - GENERAL
PAINLEVEL_OUTOF10: 4
PAINLEVEL_OUTOF10: 2
PAINLEVEL_OUTOF10: 3

## 2020-12-31 ASSESSMENT — PAIN DESCRIPTION - PAIN TYPE: TYPE: ACUTE PAIN

## 2020-12-31 ASSESSMENT — PAIN DESCRIPTION - ORIENTATION: ORIENTATION: RIGHT

## 2020-12-31 NOTE — PROGRESS NOTES
Physical Therapy    Facility/Department: [facility] 4B STEPDOWN  Initial Assessment    NAME: Pau Flowers  : 1960  MRN: [id number]    Date of Service: 2020    Discharge Recommendations:  No further therapy required at discharge. PT Equipment Recommendations  Equipment Needed: Yes  Mobility Devices: Jorge Stake: Rolling  Assessment   Body structures, Functions, Activity limitations: Decreased functional mobility ; Decreased endurance;Decreased strength; Increased pain  Assessment: The pt ambulated 100 ft with a RW x CGA. He reported increased pain in his R LE with mobilization. will continue with gait training. Prognosis: Good  Decision Making: Medium Complexity  PT Education: Goals;PT Role;Plan of Care  REQUIRES PT FOLLOW UP: Yes  Activity Tolerance  Activity Tolerance: Patient limited by fatigue   Patient Diagnosis(es): The primary encounter diagnosis was Cellulitis of right lower extremity. A diagnosis of Septicemia ([facility] [address] was also pertinent to this visit. has a past medical history of HIV disease ([facility] [address] and Hypertension. has no past surgical history on file.   Restrictions  Restrictions/Precautions  Restrictions/Precautions: Up as Tolerated  Required Braces or Orthoses?: No  Position Activity Restriction  Other position/activity restrictions: RLE cellulitis  Vision/Hearing  Vision: Impaired  Vision Exceptions: Wears glasses at all times  Hearing: Within functional limits     Subjective  General  Patient assessed for rehabilitation services?: Yes  Response To Previous Treatment: Not applicable  Family / Caregiver Present: No  Follows Commands: Within Functional Limits  Subjective  Subjective: RN and pt agreeable to PT eval  Pain Screening  Patient Currently in Pain: Yes  Pain Assessment  Pain Assessment: 0-10  Pain Level: 2  Pain Location: Leg  Pain Orientation: Right  Vital Signs  Patient Currently in Pain: Yes     Orientation  Orientation  Overall Orientation Status: Within Normal Limits  Social/Functional History  Social/Functional History  Lives With: Friend(s)  Type of Home: House  Home Layout: Two level, Able to Live on Main level with bedroom/bathroom, Performs ADL's on one level  Home Access: Stairs to enter with rails  Entrance Stairs - Number of Steps: 3  Entrance Stairs - Rails: Both  Bathroom Shower/Tub: Tub/Shower unit  Bathroom Toilet: Standard  Bathroom Equipment: (No equiptment)  Home Equipment: (No DME)  ADL Assistance: Independent  Homemaking Assistance: Independent  Homemaking Responsibilities: Yes  Ambulation Assistance: Independent  Transfer Assistance: Independent  Active : Yes  Mode of Transportation: Car  Occupation: Full time employment  Type of occupation: medical office billilng  Leisure & Hobbies: travel in the summer  Cognition      Objective     AROM RLE (degrees)  RLE AROM: WNL  AROM LLE (degrees)  LLE AROM : WNL  AROM RUE (degrees)  RUE AROM : WNL  AROM LUE (degrees)  LUE AROM : WNL  Strength RLE  Strength RLE: WFL  Strength LLE  Strength LLE: WNL  Strength RUE  Strength RUE: WNL  Strength LUE  Strength LUE: WNL     Sensation  Overall Sensation Status: WFL  Bed mobility  Supine to Sit: Minimal assistance  Sit to Supine: Minimal assistance  Scooting: Minimal assistance  Transfers  Sit to Stand: Contact guard assistance  Stand to sit: Contact guard assistance  Ambulation  Ambulation?: Yes  Ambulation 1  Surface: level tile  Device: Rolling Walker  Assistance: Contact guard assistance  Distance: amb 100 ft  With a RW x CGA    Balance  Posture: Good  Sitting - Static: Good  Sitting - Dynamic: Fair  Standing - Static: Fair  Standing - Dynamic: [facility]  Times per week: 5-6x wk  Current Treatment Recommendations: Strengthening, Functional Mobility Training, Gait Training, Safety Education & Training, Endurance Training, Stair training  Safety Devices  Type of devices: Left in bed, Call light within reach, Nurse notified    G-Code     OutComes Score AM-PAC Score  AM-PAC Inpatient Mobility Raw Score : 18 (12/31/20 1501)  AM-PAC Inpatient T-Scale Score : 43.63 (12/31/20 1501)  Mobility Inpatient CMS 0-100% Score: 46.58 (12/31/20 1501)  Mobility Inpatient CMS G-Code Modifier : CK (12/31/20 1501)        Goals  Short term goals  Time Frame for Short term goals: 10 visits  Short term goal 1: transfers with SBA  Short term goal 2: amb 250 ft with a RW x SBA  Short term goal 3: ascend/descend 4 steps with SBA  Short term goal 4: 20-30 min exercise program x SBA  Patient Goals   Patient goals : Return home     Therapy Time   Individual Concurrent Group Co-treatment   Time In 1330         Time Out 1353         Minutes 23             1 of [facility], PT

## 2020-12-31 NOTE — PROGRESS NOTES
[facility]  Internal Medicine Teaching Residency Program  Inpatient Daily Progress Note  ______________________________________________________________________________    Patient: Rei Hewitt  YOB: 1960   FRX:[id number]    Acct: [de-identified]     Room: 18/0[facility]  Admit date: 2020  Today's date: 20  Number of days in the hospital: 1    SUBJECTIVE   Admitting Diagnosis: Cellulitis of right leg  CC: Leg Swelling  Pt examined at bedside. Chart & results reviewed. No acute episodes overnight  Patient is heme stable and afebrile  Right lower leg swelling and redness improving  Patient is on Clindamycin - culture and sensitivity pending   Patient used BiPAP overnight   Outpatient Sleep Study     ROS:  Constitutional: Negative for chills and fever. Respiratory: Negative for cough, chest tightness, shortness of breath and wheezing.    Cardiovascular: Negative for chest pain and palpitations. Right leg swelling.   Gastrointestinal: Negative for abdominal pain, nausea and vomiting. Skin: Positive for rash. Negative for wound. Neurological: Negative for syncope, weakness, light-headedness and numbness.     BRIEF HISTORY     The patient is a pleasant 62 y.o. male with a past medical history of hypertension and HIV presents with a chief complaint of right leg swelling, redness, and warmth. Patient has a history of cellulitis in the same leg previously. The swelling has been present for the past day or so but patient denies any fever, chills, sweats, cough, difficulty breathing, abdominal pain, nausea, vomiting, chest pain, and shortness of breath. His only other complain is of painful and hard feeling on the back of his calf.     OBJECTIVE     Vital Signs:  BP (!) 149/78   Pulse 98   Temp 99 °F (37.2 °C) (Oral)   Resp 21   Ht 5' 6\" (1.676 m)   Wt 249 lb 5.4 oz (113.1 kg)   SpO2 93%   BMI 40.24 kg/m²     Temp (24hrs), Av.8 °F (37.7 °C), Min:98.1 °F (36.7 °C), Max:102.6 °F (39.2 °C)    In: 1341   Out: 2575 [Urine:2575]    Physical Exam:  Constitutional: This is a well developed, well nourished, Greater than 40 - Morbid Obesity / Extreme Obesity / Grade III 62y.o. year old male who is alert, oriented, cooperative and in no apparent distress. Cardiovascular: Rate and Rhythm: Regular rhythm. Tachycardia present. Normal pulses. Heart sounds: No murmur. No gallop.    Pulmonary: Effort: Pulmonary effort is normal. Breath sounds: Normal breath sounds. Abdominal: General: There is no distension. Palpations: Abdomen is soft. Tenderness: There is no abdominal tenderness. There is no guarding. Musculoskeletal: Left leg normal.  Right lower extremity with swelling, erythema, warmth calf and popliteal tenderness with a tight but compressible compartment.   Neurological: Mental Status: He is alert.  Right lower extremity is neurovascular intact.         Medications:  Scheduled Medications:    sodium chloride flush  10 mL Intravenous 2 times per day    heparin (porcine)  5,000 Units Subcutaneous 3 times per day    clindamycin (CLEOCIN) IV  600 mg Intravenous Q8H    amLODIPine  5 mg Oral Daily    efavirenz-emtrictabine-tenofovir  1 tablet Oral Nightly    atorvastatin  10 mg Oral Daily     Continuous Infusions:    lactated ringers 100 mL/hr at 12/30/20 2100     PRN Medications    metoprolol tartrate, 25 mg, PRN      sodium chloride flush, 10 mL, PRN      promethazine, 12.5 mg, Q6H PRN    Or      ondansetron, 4 mg, Q6H PRN      polyethylene glycol, 17 g, Daily PRN      acetaminophen, 650 mg, Q6H PRN    Or      acetaminophen, 650 mg, Q6H PRN      Diagnostic Labs:  CBC:   Recent Labs     12/30/20  0646 12/31/20  0549   WBC 16.4* 12.6*   RBC 4.99 4.43   HGB 14.8 13.0   HCT 46.1 42.1   MCV 92.4 95.0   RDW 13.2 13.5    210     BMP:   Recent Labs     12/30/20  0646 12/31/20  0549   * 136   K 4.8 4.4   CL 96* 100   CO2 25 25   BUN 22* 13   CREATININE 1.28* 0.89        ASSESSMENT & PLAN   Right Lower Extremity Cellulitis   IV Clindamycin 600 mg in Dextrose 5% 50 mL IVPB  Blood Cultures x 2 - No Growth for 10 hours  Urine Culture Pending  Will continue to monitor      Benign Essential Hypertension  /78  Norvasc 5 mg PO Daily   Will continue to monitor      DVT ppx: Heparin      PT/OT/SW: On Board   Discharge Planning: Ongoing pending Culture and Sensitivity     Yemi Fox MD  Internal Medicine Resident, PGY-1  [facility]; [place], [place]  12/31/2020, 11:25 AM      Attending Physician Statement  I have discussed the case, including pertinent history and exam findings with the resident and the team.  I have seen and examined the patient and the key elements of the encounter have been performed by me. I agree with the assessment, plan and orders as documented by the resident.       Ping Padilla MD   Attending Physician, Internal Medicine Residency Program  12/31/2020, 12:10 PM

## 2020-12-31 NOTE — PROGRESS NOTES
Occupational Therapy   Occupational Therapy Initial Assessment  Date: 2020   Patient Name: March Night  MRN: [id number]     : 1960    Date of Service: 2020    Discharge Recommendations:  Patient would benefit from continued therapy after discharge, Continue to assess pending progress  OT Equipment Recommendations  Other: CTA    Assessment   Performance deficits / Impairments: Decreased functional mobility ; Decreased safe awareness;Decreased balance;Decreased ADL status; Decreased endurance;Decreased high-level IADLs  Assessment: Pt demonstrated functional transfers and mobility with CGA and use of RW. Pt demonstrated decreased indurance and required mod-max cues for safety awareness. Pt impulsive throuhout. Pt completed oral hygeine standing at sink with CGA and required Mod A to don R sock. Pt is expected to benefit from skilled OT services during his acute hospitilization stay to maximize safety and increase independence in ADLs/IADLs and functional mobility tasks. Decision Making: Medium Complexity  Patient Education: Pt educated  on OT role, OT poc, activity promotion, energy conservation, safety awareness throughout mobility, awareness of line/tubes for safety-good return  REQUIRES OT FOLLOW UP: Yes  Activity Tolerance  Activity Tolerance: Patient Tolerated treatment well;Patient limited by fatigue;Patient limited by pain  Activity Tolerance: Limited by decreased safety awareness  Safety Devices  Safety Devices in place: Yes  Type of devices: Gait belt; All fall risk precautions in place; Bed alarm in place; Patient at risk for falls; Left in bed;Call light within reach;Nurse notified  Restraints  Initially in place: No           Patient Diagnosis(es): The primary encounter diagnosis was Cellulitis of right lower extremity. A diagnosis of Septicemia ([facility] [address] was also pertinent to this visit. has a past medical history of HIV disease ([facility] [address] and Hypertension.    has no past surgical history on file.           Restrictions  Restrictions/Precautions  Restrictions/Precautions: Up as Tolerated  Required Braces or Orthoses?: No  Position Activity Restriction  Other position/activity restrictions: RLE cellulitis    Subjective   General  Patient assessed for rehabilitation services?: Yes  Family / Caregiver Present: No  General Comment  Comments: RN ok'd for therapy visit this date. Pt agreeable to session, pleasent/cooperative throghout.   Patient Currently in Pain: Yes  Pain Assessment  Pain Assessment: 0-10  Pain Level: 2  Pain Type: Acute pain  Pain Location: Leg  Pain Orientation: Right  Vital Signs  Patient Currently in Pain: Yes     Social/Functional History  Social/Functional History  Lives With: Friend(s)  Type of Home: House  Home Layout: Two level, Able to Live on Main level with bedroom/bathroom, Performs ADL's on one level  Home Access: Stairs to enter with rails  Entrance Stairs - Number of Steps: 3  Entrance Stairs - Rails: Both  Bathroom Shower/Tub: Tub/Shower unit  Bathroom Toilet: Standard  Bathroom Equipment: (No equiptment)  Home Equipment: (No DME)  ADL Assistance: Independent  Homemaking Assistance: Independent  Homemaking Responsibilities: Yes  Ambulation Assistance: Independent  Transfer Assistance: Independent  Active : Yes  Mode of Transportation: Car  Occupation: Full time employment  Type of occupation: medical office billilng  Leisure & Hobbies: travel in the summer       Objective   Vision: Impaired  Vision Exceptions: Wears glasses at all times  Hearing: Within functional limits    Orientation  Overall Orientation Status: Within Functional Limits     Balance  Sitting Balance: Contact guard assistance(Sitting EOB for LB dressing ~5 minutes)  Standing Balance: Contact guard assistance  Standing Balance  Time: ~10 minutes  Activity: functional mobility around doran with PT, in room, static standing, oral hygeine standing at sink  Comment: use of RW    Functional Mobility  Functional - Mobility Device: Rolling Walker  Activity: To/from bathroom; Other(in doran)  Assist Level: Contact guard assistance  Functional Mobility Comments: Initially pt did not want to use RW, but was unsteady with minor LOBs. Pt agreed to use and demonstrated increased steadiness. Pt with stiff RLE during amulation. Cues provided for safety awarness throughout. Pt slightly SOB after ambulation. ADL  Feeding: Setup  Grooming: Contact guard assistance(Pt completed oral hygeine standing sinkside with setup provided.)  UE Bathing: Contact guard assistance  LE Bathing: Contact guard assistance  UE Dressing: Contact guard assistance  LE Dressing: Moderate assistance(Assistance to don R sock d/t significant pain)  Toileting: Contact guard assistance     Tone RUE  RUE Tone: Normotonic  Tone LUE  LUE Tone: Normotonic  Coordination  Movements Are Fluid And Coordinated: Yes     Bed mobility  Supine to Sit: Minimal assistance(HHA for trunk progression)  Sit to Supine: Minimal assistance(Assist for RLE progression)  Scooting: Minimal assistance(Min A to scoot EOB d/t pain with HHA)  Comment: Pt required cues for sequencing of bed mobility to increase ease. Transfers  Sit to stand: Contact guard assistance  Stand to sit: Contact guard assistance  Transfer Comments: sit<>stand with RW and sit<>stand without RW from EOB. Pt impulsive requiring cues to wait for writers readiness d/t lines/IV pole requiring organization. Pt continued to stand and walk forward requiring MAX cues to sit back down EOB. Education provided on importance of ensuring lines are organized to ensure pt does not trip. Cognition  Overall Cognitive Status: Exceptions  Attention Span: Attends with cues to redirect  Safety Judgement: Decreased awareness of need for assistance;Decreased awareness of need for safety  Insights: Decreased awareness of deficits  Cognition Comment: Pt extremely impulsive this date compromising safety.  Pt required cues throughout to wait for writer's readiness and  to bring attention to IV pole/lines. Appears eager to demonstrate independence.         Sensation  Overall Sensation Status: WFL(Denies numbness or tingling)        LUE AROM (degrees)  LUE AROM : WFL  Left Hand AROM (degrees)  Left Hand AROM: WFL  RUE AROM (degrees)  RUE AROM : WFL  Right Hand AROM (degrees)  Right Hand AROM: WFL  LUE Strength  Gross LUE Strength: WFL  L Hand General: 5/5  LUE Strength Comment: Grossly 5/5  RUE Strength  Gross RUE Strength: WFL  R Hand General: 5/5  RUE Strength Comment: Grossly 5/5           Plan   Plan  Times per week: 2-3x/week  Current Treatment Recommendations: Safety Education & Training, Balance Training, Patient/Caregiver Education & Training, Cognitive/Perceptual Training, Functional Mobility Training, Self-Care / ADL, Equipment Evaluation, Education, & procurement, Endurance Training             AM-PAC Score        AM-PAC Inpatient Daily Activity Raw Score: 19 (12/31/20 1516)  AM-PAC Inpatient ADL T-Scale Score : 40.22 (12/31/20 1516)  ADL Inpatient CMS 0-100% Score: 42.8 (12/31/20 1516)  ADL Inpatient CMS G-Code Modifier : CK (12/31/20 1516)    Goals  Short term goals  Time Frame for Short term goals: By discharge, pt will:  Short term goal 1: Demonstrate functional mobility and functional transfers with Mod I, using LRD PRN and 0 VCs for safety  Short term goal 2: Demonstrate ADLs with Mod I, setup, use of DME PRN, 0 VCs for safety  Short term goal 3: Demonstrate +20 minutes of standing tolerance with supervision for increased engagement in ADLs/IADLs  Short term goal 4: Demonstrate safety awanreness, including awareness to lines, throughout all functional mobility/ADL tasks  Short term goal 5: Demonstrate use of energy conservation techniques as needed throughout all ADL/IADL tasks       Therapy Time   Individual Concurrent Group Co-treatment   Time In 1329         Time Out 1351         Minutes 22         Timed Code

## 2020-12-31 NOTE — PLAN OF CARE
Problem: Respiratory  Intervention: Respiratory assessment  Note:   PROVIDE ADEQUATE OXYGENATION WITH ACCEPTABLE SP02/ABG'S    [x]  IDENTIFY APPROPRIATE OXYGEN THERAPY  [x]   MONITOR SP02/ABG'S AS NEEDED   [x]   PATIENT EDUCATION AS NEEDED   NON INVASIVE VENTILATION  PROVIDE OPTIMAL VENTILATION/ACCEPTABLE SP02  IMPLEMENT NON INVASIVE VENTILATION PROTOCOL  ASSESSMENT SKIN INTEGRITY  PATIENT EDUCATION AS NEEDED  BIPAP AS NEEDED

## 2020-12-31 NOTE — H&P
nightly   Yes Historical Provider, MD   metoprolol tartrate (LOPRESSOR) 50 MG tablet Take 50 mg by mouth 2 times daily   Yes Historical Provider, MD   amLODIPine (NORVASC) 5 MG tablet Take 5 mg by mouth daily   Yes Historical Provider, MD     SOCIAL HISTORY     Tobacco: Does Not Smoke  Alcohol: Social Drinker  Illicits: Does Not Use  Occupation: Did Not Ask     FAMILY HISTORY     History reviewed. No pertinent family history. PHYSICAL EXAM     Vitals: BP (!) 144/84   Pulse 110   Temp 98.1 °F (36.7 °C) (Oral)   Resp 16   Ht 5' 6\" (1.676 m)   Wt 250 lb (113.4 kg)   SpO2 99%   BMI 40.35 kg/m²   Tmax: Temp (24hrs), Av.9 °F (37.2 °C), Min:98.1 °F (36.7 °C), Max:100.4 °F (38 °C)    Last Body weight:   Wt Readings from Last 3 Encounters:   20 250 lb (113.4 kg)     Body Mass Index : Body mass index is 40.35 kg/m². PHYSICAL EXAMINATION:  Constitutional: This is a well developed, well nourished, Greater than 40 - Morbid Obesity / Extreme Obesity / Grade III 62y.o. year old male who is alert, oriented, cooperative and in no apparent distress. Cardiovascular: Rate and Rhythm: Regular rhythm. Tachycardia present. Normal pulses. Heart sounds: No murmur. No gallop. Pulmonary: Effort: Pulmonary effort is normal. Breath sounds: Normal breath sounds. Abdominal: General: There is no distension. Palpations: Abdomen is soft. Tenderness: There is no abdominal tenderness. There is no guarding. Musculoskeletal: Left leg normal.  Right lower extremity with swelling, erythema, warmth calf and popliteal tenderness with a tight but compressible compartment. Neurological: Mental Status: He is alert. Right lower extremity is neurovascular intact.      INVESTIGATIONS     Laboratory Testing:     Recent Results (from the past 24 hour(s))   CBC WITH AUTO DIFFERENTIAL    Collection Time: 20  6:46 AM   Result Value Ref Range    WBC 16.4 (H) 3.5 - 11.3 k/uL    RBC 4.99 4.21 - 5.77 m/uL    Hemoglobin 14.8 13.0 - 17.0 g/dL    Hematocrit 46.1 40.7 - 50.3 %    MCV 92.4 82.6 - 102.9 fL    MCH 29.7 25.2 - 33.5 pg    MCHC 32.1 28.4 - 34.8 g/dL    RDW 13.2 11.8 - 14.4 %    Platelets 801 793 - 868 k/uL    MPV 8.7 8.1 - 13.5 fL    NRBC Automated 0.0 0.0 per 100 WBC    Differential Type NOT REPORTED     WBC Morphology NOT REPORTED     RBC Morphology NOT REPORTED     Platelet Estimate NOT REPORTED     Immature Granulocytes 1 (H) 0 %    Seg Neutrophils 94 (H) 36 - 65 %    Lymphocytes 3 (L) 24 - 43 %    Monocytes 2 (L) 3 - 12 %    Eosinophils % 0 (L) 1 - 4 %    Basophils 0 0 - 2 %    Absolute Immature Granulocyte 0.16 0.00 - 0.30 k/uL    Segs Absolute 15.42 (H) 1.50 - 8.10 k/uL    Absolute Lymph # 0.49 (L) 1.10 - 3.70 k/uL    Absolute Mono # 0.33 0.10 - 1.20 k/uL    Absolute Eos # 0.00 0.00 - 0.44 k/uL    Basophils Absolute 0.00 0.00 - 0.20 k/uL    Morphology Normal    BASIC METABOLIC PANEL    Collection Time: 12/30/20  6:46 AM   Result Value Ref Range    Glucose 105 (H) 70 - 99 mg/dL    BUN 22 (H) 6 - 20 mg/dL    CREATININE 1.28 (H) 0.70 - 1.20 mg/dL    Bun/Cre Ratio NOT REPORTED 9 - 20    Calcium 9.8 8.6 - 10.4 mg/dL    Sodium 133 (L) 135 - 144 mmol/L    Potassium 4.8 3.7 - 5.3 mmol/L    Chloride 96 (L) 98 - 107 mmol/L    CO2 25 20 - 31 mmol/L    Anion Gap 12 9 - 17 mmol/L    GFR Non-African American 58 (L) >60 mL/min    GFR African American >60 >60 mL/min    GFR Comment          GFR Staging NOT REPORTED    C-REACTIVE PROTEIN    Collection Time: 12/30/20  6:46 AM   Result Value Ref Range    .9 (H) 0.0 - 5.0 mg/L   Sedimentation Rate    Collection Time: 12/30/20  6:46 AM   Result Value Ref Range    Sed Rate 35 (H) 0 - 20 mm   Culture, Blood 1    Collection Time: 12/30/20  8:15 AM    Specimen: Blood   Result Value Ref Range    Specimen Description . BLOOD     Special Requests LEFT HAND 2 ML     Culture NO GROWTH 6 HOURS    Culture, Blood 1    Collection Time: 12/30/20  8:25 AM    Specimen: Blood   Result Value Ref Range Specimen Description . BLOOD     Special Requests RT FOREARM 10ML     Culture NO GROWTH 8 HOURS    Lactate, Sepsis    Collection Time: 12/30/20  8:31 AM   Result Value Ref Range    Lactic Acid, Sepsis NOT REPORTED 0.5 - 1.9 mmol/L    Lactic Acid, Sepsis, Whole Blood 1.4 0.5 - 1.9 mmol/L   Protime-INR    Collection Time: 12/30/20  8:31 AM   Result Value Ref Range    Protime 10.6 9.0 - 12.0 sec    INR 1.0    APTT    Collection Time: 12/30/20  8:31 AM   Result Value Ref Range    PTT 27.0 20.5 - 30.5 sec   HEPATIC FUNCTION PANEL    Collection Time: 12/30/20  8:31 AM   Result Value Ref Range    Alb 3.7 3.5 - 5.2 g/dL    Alkaline Phosphatase 81 40 - 129 U/L    ALT 32 5 - 41 U/L    AST 28 <40 U/L    Total Bilirubin 0.44 0.3 - 1.2 mg/dL    Bilirubin, Direct 0.18 <0.31 mg/dL    Bilirubin, Indirect 0.26 0.00 - 1.00 mg/dL    Total Protein 6.7 6.4 - 8.3 g/dL    Globulin NOT REPORTED 1.5 - 3.8 g/dL    Albumin/Globulin Ratio 1.2 1.0 - 2.5   Lactate, Sepsis    Collection Time: 12/30/20 11:01 AM   Result Value Ref Range    Lactic Acid, Sepsis NOT REPORTED 0.5 - 1.9 mmol/L    Lactic Acid, Sepsis, Whole Blood 1.4 0.5 - 1.9 mmol/L   URINALYSIS    Collection Time: 12/30/20 11:18 AM   Result Value Ref Range    Color, UA YELLOW YELLOW    Turbidity UA CLEAR CLEAR    Glucose, Ur NEGATIVE NEGATIVE    Bilirubin Urine NEGATIVE NEGATIVE    Ketones, Urine NEGATIVE NEGATIVE    Specific Gravity, UA 1.022 1.005 - 1.030    Urine Hgb NEGATIVE NEGATIVE    pH, UA 6.0 5.0 - 8.0    Protein, UA NEGATIVE NEGATIVE    Urobilinogen, Urine Normal Normal    Nitrite, Urine NEGATIVE NEGATIVE    Leukocyte Esterase, Urine NEGATIVE NEGATIVE    Urinalysis Comments       Microscopic exam not performed based on chemical results unless requested in original order. Imaging:   Xr Tibia Fibula Right (2 Views)    Result Date: 12/30/2020  No radiographic evidence for acute osseous abnormality or infection. Diffuse edema of the lower lytic suggestive of cellulitis. Please correlate with physical exam.     Xr Ankle Right (2 Views)    Result Date: 12/30/2020  No radiographic evidence for acute osseous abnormality or infection. Diffuse edema of the lower lytic suggestive of cellulitis. Please correlate with physical exam.     ASSESSMENT & PLAN   IMPRESSION  This is a 62 y.o. male who presented with right lower extremity swelling, redness, and warmth. Patient admitted to inpatient status for management of Cellulitis. Right Lower Extremity Cellulitis   IV Clindamycin 600 mg in Dextrose 5% 50 mL IVPB  Blood Cultures x 2 - No Growth for 8 hours  Urine Culture Pending  Will continue to monitor     Benign Essential Hypertension  /79  Norvasc 5 mg PO Daily   Will continue to monitor     DVT ppx: Heparin     PT/OT/SW: On Board   Discharge Planning: Ongoing pending Culture and Sensitivity     Basia Crowley MD  Internal Medicine Resident, PGY-1  [facility]; [place], [place]  12/30/2020, 7:26 PM      Attending Physician Statement  I have discussed the case, including pertinent history and exam findings with the resident and the team.  I have seen and examined the patient and the key elements of the encounter have been performed by me. I agree with the assessment, plan and orders as documented by the resident. In Brief:  Patient presented with right lower leg pain and redness associated with swelling consistent with leg cellulitis. Patient completed a Doppler without any clots. Patient has morbid obesity with a clinical symptoms of obstructive sleep apnea and would need further work-up as an outpatient. Continue IV antibiotics today and change to p.o. tomorrow for possible discharge.     Ping Cui MD   Attending Physician, Internal Medicine Residency Program  12/31/2020, 12:06 PM

## 2021-01-01 VITALS
BODY MASS INDEX: 40.36 KG/M2 | OXYGEN SATURATION: 97 % | HEART RATE: 80 BPM | WEIGHT: 251.1 LBS | HEIGHT: 66 IN | SYSTOLIC BLOOD PRESSURE: 130 MMHG | RESPIRATION RATE: 25 BRPM | DIASTOLIC BLOOD PRESSURE: 76 MMHG | TEMPERATURE: 99.1 F

## 2021-01-01 PROBLEM — A41.9 SEPSIS (HCC): Status: RESOLVED | Noted: 2020-12-30 | Resolved: 2021-01-01

## 2021-01-01 LAB
ABSOLUTE EOS #: 0.06 K/UL (ref 0–0.44)
ABSOLUTE IMMATURE GRANULOCYTE: 0.07 K/UL (ref 0–0.3)
ABSOLUTE LYMPH #: 0.9 K/UL (ref 1.1–3.7)
ABSOLUTE MONO #: 0.46 K/UL (ref 0.1–1.2)
ANION GAP SERPL CALCULATED.3IONS-SCNC: 9 MMOL/L (ref 9–17)
BASOPHILS # BLD: 0 % (ref 0–2)
BASOPHILS ABSOLUTE: <0.03 K/UL (ref 0–0.2)
BUN BLDV-MCNC: 11 MG/DL (ref 6–20)
BUN/CREAT BLD: ABNORMAL (ref 9–20)
CALCIUM SERPL-MCNC: 8.9 MG/DL (ref 8.6–10.4)
CHLORIDE BLD-SCNC: 104 MMOL/L (ref 98–107)
CO2: 24 MMOL/L (ref 20–31)
CREAT SERPL-MCNC: 0.69 MG/DL (ref 0.7–1.2)
DIFFERENTIAL TYPE: ABNORMAL
EOSINOPHILS RELATIVE PERCENT: 1 % (ref 1–4)
GFR AFRICAN AMERICAN: >60 ML/MIN
GFR NON-AFRICAN AMERICAN: >60 ML/MIN
GFR SERPL CREATININE-BSD FRML MDRD: ABNORMAL ML/MIN/{1.73_M2}
GFR SERPL CREATININE-BSD FRML MDRD: ABNORMAL ML/MIN/{1.73_M2}
GLUCOSE BLD-MCNC: 91 MG/DL (ref 70–99)
HCT VFR BLD CALC: 35.8 % (ref 40.7–50.3)
HEMOGLOBIN: 11.2 G/DL (ref 13–17)
IMMATURE GRANULOCYTES: 1 %
LYMPHOCYTES # BLD: 10 % (ref 24–43)
MCH RBC QN AUTO: 29.8 PG (ref 25.2–33.5)
MCHC RBC AUTO-ENTMCNC: 31.3 G/DL (ref 28.4–34.8)
MCV RBC AUTO: 95.2 FL (ref 82.6–102.9)
MONOCYTES # BLD: 5 % (ref 3–12)
NRBC AUTOMATED: 0 PER 100 WBC
PDW BLD-RTO: 13.5 % (ref 11.8–14.4)
PLATELET # BLD: 193 K/UL (ref 138–453)
PLATELET ESTIMATE: ABNORMAL
PMV BLD AUTO: 8.8 FL (ref 8.1–13.5)
POTASSIUM SERPL-SCNC: 4 MMOL/L (ref 3.7–5.3)
RBC # BLD: 3.76 M/UL (ref 4.21–5.77)
RBC # BLD: ABNORMAL 10*6/UL
SEG NEUTROPHILS: 83 % (ref 36–65)
SEGMENTED NEUTROPHILS ABSOLUTE COUNT: 7.66 K/UL (ref 1.5–8.1)
SODIUM BLD-SCNC: 137 MMOL/L (ref 135–144)
WBC # BLD: 9.2 K/UL (ref 3.5–11.3)
WBC # BLD: ABNORMAL 10*3/UL

## 2021-01-01 PROCEDURE — 2500000003 HC RX 250 WO HCPCS: Performed by: STUDENT IN AN ORGANIZED HEALTH CARE EDUCATION/TRAINING PROGRAM

## 2021-01-01 PROCEDURE — 6370000000 HC RX 637 (ALT 250 FOR IP): Performed by: STUDENT IN AN ORGANIZED HEALTH CARE EDUCATION/TRAINING PROGRAM

## 2021-01-01 PROCEDURE — 99238 HOSP IP/OBS DSCHRG MGMT 30/<: CPT | Performed by: INTERNAL MEDICINE

## 2021-01-01 PROCEDURE — 97110 THERAPEUTIC EXERCISES: CPT

## 2021-01-01 PROCEDURE — 36415 COLL VENOUS BLD VENIPUNCTURE: CPT

## 2021-01-01 PROCEDURE — 94660 CPAP INITIATION&MGMT: CPT

## 2021-01-01 PROCEDURE — 97116 GAIT TRAINING THERAPY: CPT

## 2021-01-01 PROCEDURE — 80048 BASIC METABOLIC PNL TOTAL CA: CPT

## 2021-01-01 PROCEDURE — 85025 COMPLETE CBC W/AUTO DIFF WBC: CPT

## 2021-01-01 PROCEDURE — 2580000003 HC RX 258: Performed by: STUDENT IN AN ORGANIZED HEALTH CARE EDUCATION/TRAINING PROGRAM

## 2021-01-01 PROCEDURE — 6360000002 HC RX W HCPCS: Performed by: STUDENT IN AN ORGANIZED HEALTH CARE EDUCATION/TRAINING PROGRAM

## 2021-01-01 RX ORDER — METOPROLOL TARTRATE 50 MG/1
50 TABLET, FILM COATED ORAL 2 TIMES DAILY
Status: DISCONTINUED | OUTPATIENT
Start: 2021-01-01 | End: 2021-01-01 | Stop reason: HOSPADM

## 2021-01-01 RX ORDER — CLINDAMYCIN HYDROCHLORIDE 300 MG/1
300 CAPSULE ORAL 3 TIMES DAILY
Qty: 15 CAPSULE | Refills: 0 | Status: SHIPPED | OUTPATIENT
Start: 2021-01-01 | End: 2021-01-06

## 2021-01-01 RX ADMIN — CLINDAMYCIN IN 5 PERCENT DEXTROSE 600 MG: 12 INJECTION, SOLUTION INTRAVENOUS at 06:30

## 2021-01-01 RX ADMIN — SODIUM CHLORIDE, POTASSIUM CHLORIDE, SODIUM LACTATE AND CALCIUM CHLORIDE: 600; 310; 30; 20 INJECTION, SOLUTION INTRAVENOUS at 05:27

## 2021-01-01 RX ADMIN — AMLODIPINE BESYLATE 5 MG: 5 TABLET ORAL at 08:04

## 2021-01-01 RX ADMIN — ACETAMINOPHEN 650 MG: 325 TABLET ORAL at 03:28

## 2021-01-01 RX ADMIN — CLINDAMYCIN IN 5 PERCENT DEXTROSE 600 MG: 12 INJECTION, SOLUTION INTRAVENOUS at 13:39

## 2021-01-01 RX ADMIN — METOPROLOL TARTRATE 50 MG: 50 TABLET, FILM COATED ORAL at 08:04

## 2021-01-01 RX ADMIN — HEPARIN SODIUM 5000 UNITS: 5000 INJECTION INTRAVENOUS; SUBCUTANEOUS at 06:30

## 2021-01-01 RX ADMIN — SODIUM CHLORIDE, PRESERVATIVE FREE 10 ML: 5 INJECTION INTRAVENOUS at 08:07

## 2021-01-01 RX ADMIN — ATORVASTATIN CALCIUM 10 MG: 10 TABLET, FILM COATED ORAL at 08:04

## 2021-01-01 ASSESSMENT — PAIN SCALES - GENERAL: PAINLEVEL_OUTOF10: 2

## 2021-01-01 NOTE — PLAN OF CARE
Problem: Falls - Risk of:  Goal: Will remain free from falls  Description: Will remain free from falls  1/1/2021 0941 by Florian Munguia RN  Outcome: Ongoing  1/1/2021 0459 by Wendy Knutson RN  Outcome: Ongoing  1/1/2021 0458 by Wendy Knutson RN  Outcome: Ongoing   Pt resting in bed comfortably. Side rails up x2. Bed locked in lowest position. Call light within reach. Pt resting in chair now.

## 2021-01-01 NOTE — PROGRESS NOTES
[facility]  Internal Medicine Teaching Residency Program  Inpatient Daily Progress Note  ______________________________________________________________________________    Patient: March Night  YOB: 1960   CQL:[id number]    Acct: [de-identified]     Room: 18/0-65  Admit date: 12/30/2020  Today's date: 01/01/21  Number of days in the hospital: 2    SUBJECTIVE   Admitting Diagnosis: Cellulitis of right leg  CC: No new fresh complaint overnight. Pain in swelling significantly improved  Pt examined at bedside. Chart & results reviewed. Patient remains medically stable with blood pressures of 140/76 remain afebrile for more than 24-hour. He was on BiPAP overnight saturating well  Blood culture and urine culture showed no growth for 2 days. Sodium 137 potassium 4 blood sugar level 91 BUN 11 and creatinine 0.69        ROS:  Constitutional:  negative for chills, fevers, sweats  Respiratory:  negative for cough, dyspnea on exertion, hemoptysis, shortness of breath, wheezing  Cardiovascular:  negative for chest pain, chest pressure/discomfort, lower extremity edema, palpitations  Gastrointestinal:  negative for abdominal pain, constipation, diarrhea, nausea, vomiting  Neurological:  negative for dizziness, headache  BRIEF HISTORY     The patient is a pleasant 60 y. o. male with a past medical history of hypertension and HIV presents with a chief complaint of right leg swelling, redness, and warmth.  Patient has a history of cellulitis in the same leg previously.  The swelling has been present for the past day or so but patient denies any fever, chills, sweats, cough, difficulty breathing, abdominal pain, nausea, vomiting, chest pain, and shortness of breath.  His only other complain is of painful and hard feeling on the back of his calf.     OBJECTIVE     Vital Signs:  BP (!) 140/79   Pulse 85   Temp 97.9 °F (36.6 °C) (Axillary)   Resp 19   Ht 5' 6\" (1.676 m) Wt 251 lb 1.7 oz (113.9 kg)   SpO2 99%   BMI 40.53 kg/m²     Temp (24hrs), Av.9 °F (37.2 °C), Min:97.3 °F (36.3 °C), Max:100 °F (37.8 °C)    In: 2829   Out: 1325 [Urine:1325]    Physical Exam:  Constitutional: This is a well developed, well nourished, 35-39.9 - Obesity Grade II 62y.o. year old male who is alert, oriented, cooperative and in no apparent distress. Head:normocephalic and atraumatic. EENT:  PERRLA. No conjunctival injections. Septum was midline, mucosa was without erythema, exudates or cobblestoning. No thrush was noted. Neck: Supple without thyromegaly. No elevated JVP. Trachea was midline. Respiratory: Chest was symmetrical without dullness to percussion. Breath sounds bilaterally were clear to auscultation. There were no wheezes, rhonchi or rales. There is no intercostal retraction or use of accessory muscles. No egophony noted. Cardiovascular: Regular without murmur, clicks, gallops or rubs. Abdomen: Slightly rounded and soft without organomegaly. No rebound, rigidity or guarding was appreciated. Lymphatic: No lymphadenopathy. Musculoskeletal: Normal curvature of the spine. No gross muscle weakness. Extremities: right lower extremities edema,redness and tenderness present . Muscle size, tone and strength are normal.  No involuntary movements are noted. Skin:  Warm and dry. Good color, turgor and pigmentation. No lesions or scars.   No cyanosis or clubbing  Neurological/Psychiatric: The patient's general behavior, level of consciousness, thought content and emotional status is normal.        Medications:  Scheduled Medications:    enoxaparin  30 mg Subcutaneous BID    metoprolol tartrate  50 mg Oral BID    sodium chloride flush  10 mL Intravenous 2 times per day    clindamycin (CLEOCIN) IV  600 mg Intravenous Q8H    amLODIPine  5 mg Oral Daily    efavirenz-emtrictabine-tenofovir  1 tablet Oral Nightly    atorvastatin  10 mg Oral Daily     Continuous Infusions:    lactated ringers 100 mL/hr at 01/01/21 0527     PRN Medications    metoprolol tartrate, 25 mg, PRN      sodium chloride flush, 10 mL, PRN      promethazine, 12.5 mg, Q6H PRN    Or      ondansetron, 4 mg, Q6H PRN      polyethylene glycol, 17 g, Daily PRN      acetaminophen, 650 mg, Q6H PRN    Or      acetaminophen, 650 mg, Q6H PRN        Diagnostic Labs:  CBC:   Recent Labs     12/30/20  0646 12/31/20  0549 01/01/21  0624   WBC 16.4* 12.6* 9.2   RBC 4.99 4.43 3.76*   HGB 14.8 13.0 11.2*   HCT 46.1 42.1 35.8*   MCV 92.4 95.0 95.2   RDW 13.2 13.5 13.5    210 193     BMP:   Recent Labs     12/30/20  0646 12/31/20  0549 01/01/21  0624   * 136 137   K 4.8 4.4 4.0   CL 96* 100 104   CO2 25 25 24   BUN 22* 13 11   CREATININE 1.28* 0.89 0.69*     ASSESSMENT & PLAN     Patient Active Problem List   Diagnosis    Cellulitis of right leg    Morbid obesity due to excess calories (HCC)     Right lower extremity cellulitis-resolving. Continue IV clindamycin 600 mg. Urine culture blood cultures showed no growth for 2 days. Benign essential hypertension-controlled with Norvasc and lopressor    TOSHA- will evaluate sleep study as outpatient. continue Bipap nightly    DVT ppx : Heparin  GI ppx: none    PT/OT: onbord  Discharge Planning / SW: probable discharge today to home. Juan Panda MD  Internal Medicine Resident, PGY-1  [address], [place]  1/1/2021, 10:35 AM      Attending Physician Statement  I have discussed the case, including pertinent history and exam findings with the resident and the team.  I have seen and examined the patient and the key elements of the encounter have been performed by me. I agree with the assessment, plan and orders as documented by the resident.       Change IV to PO Clindamycin  Ok to DC home today    Ping Garcia MD   Attending Physician, Internal Medicine Residency Program  1/1/2021, 1:16 PM

## 2021-01-01 NOTE — PROGRESS NOTES
Physical Therapy  Facility/Department: [facility] 4B STEPDOWN  Daily Treatment Note  NAME: Kindra High  : 1960  MRN: [id number]    Date of Service: 2021    Discharge Recommendations:  Patient would benefit from continued therapy after discharge   PT Equipment Recommendations  Equipment Needed: No    Assessment   Assessment: The pt ambulated 300 ft noAD with supervision. Pt also negotiated 10 stairs with supervision. Reported increased pain in his R LE with mobilization. Would benefit from continued therapy after d/c. Prognosis: Good  REQUIRES PT FOLLOW UP: Yes  Activity Tolerance  Activity Tolerance: Patient Tolerated treatment well     Patient Diagnosis(es): The primary encounter diagnosis was Cellulitis of right lower extremity. A diagnosis of Septicemia ([facility] [address] was also pertinent to this visit. has a past medical history of HIV disease ([facility] [address] and Hypertension. has no past surgical history on file. Restrictions  Restrictions/Precautions  Restrictions/Precautions: Up as Tolerated  Required Braces or Orthoses?: No  Position Activity Restriction  Other position/activity restrictions: RLE cellulitis  Subjective   General  Chart Reviewed: Yes  Response To Previous Treatment: Patient with no complaints from previous session. Family / Caregiver Present: No  Subjective  Subjective: RN and pt agreeable to PT  Pain Screening  Patient Currently in Pain: Denies  Vital Signs  Patient Currently in Pain: Denies       Orientation  Orientation  Overall Orientation Status: Within Normal Limits  Cognition      Objective   Bed mobility  Comment: in chair upon arrival  Transfers  Sit to Stand: Modified independent  Stand to sit: Modified independent  Comment: Pt stood on his own without warning to don gait belt.   Ambulation  Ambulation?: Yes  Ambulation 1  Surface: level tile  Device: No Device  Assistance: Modified Independent  Quality of Gait: steady no LOB  Distance: 300ft  Stairs/Curb  Stairs?: Yes  Stairs  # Steps : 10  Stairs Height: 6\"  Rails: Right ascending  Device: No Device  Assistance: Supervision     Balance  Posture: Good  Sitting - Static: Good  Sitting - Dynamic: Good  Standing - Static: Good  Standing - Dynamic: Good  Comments: standing balance assessed without AD  Exercises  Seated LE exercise program: Long Arc Quads, hip abduction/adduction, heel/toe raises, and marches. Reps: x10 x2  Standing exercise program: Heel/toe raises, hip flexion, hip abduction, mini squats, hip extension, and hamstring curls.  Reps: x10 with RW  Goals  Short term goals  Time Frame for Short term goals: 10 visits  Short term goal 1: transfers with SBA  Short term goal 2: amb 250 ft with a RW x SBA  Short term goal 3: ascend/descend 4 steps with SBA  Short term goal 4: 20-30 min exercise program x SBA  Patient Goals   Patient goals : Return home    Plan    Plan  Times per week: 5-6x wk  Current Treatment Recommendations: Strengthening, Functional Mobility Training, Gait Training, Safety Education & Training, Endurance Training, Stair training  Safety Devices  Type of devices: Left in bed, Call light within reach, Nurse notified     Therapy Time   Individual Concurrent Group Co-treatment   Time In 1030         Time Out 1054         Minutes [address] [place] [address]

## 2021-01-01 NOTE — PLAN OF CARE
Problem: Falls - Risk of:  Goal: Will remain free from falls  Description: Will remain free from falls  1/1/2021 0459 by Lorenzo Piedra RN  Outcome: Ongoing     Problem: Physical Regulation:  Goal: Ability to maintain vital signs within normal range will improve  Description: Ability to maintain vital signs within normal range will improve  Outcome: Ongoing     Problem: Pain:  Goal: Pain level will decrease  Description: Pain level will decrease  1/1/2021 0459 by Lorenzo Piedra RN  Outcome: Ongoing

## 2021-01-04 NOTE — DISCHARGE SUMMARY
tablet Take 1 tablet by mouth nightlyHistorical Med      metoprolol tartrate (LOPRESSOR) 50 MG tablet Take 50 mg by mouth 2 times dailyHistorical Med      amLODIPine (NORVASC) 5 MG tablet Take 5 mg by mouth dailyHistorical Med             Activity: activity as tolerated    Diet: regular diet    Follow-up:    Ling Villalba MD  [address] [id number] [phone]    Schedule an appointment as soon as possible for a visit in 1 week  Post hospital follow up, sleep apnea evaluation    Mmaadou Baez MD  [address] [address]  [phone]    Schedule an appointment as soon as possible for a visit in 1 week        Patient Instructions:   Start taking clindamycin 3 times daily for next 5 days. If fever, worsening right leg swelling, worsening pain, discoloration to black, numbness/tingling or right foot, please call your PCP or go to ER. Follow up with PCP in 1 week. Needs sleep apnea evaluation as OP. Weight loss management. Follow up labs:none  Follow up imaging: none    Note that over 30 minutes was spent in preparing discharge papers, discussing discharge with patient, medication review, etc.      Maddie Lennon MD, MD  Internal Medicine Resident, PGY-1  [facility];  [place], [place]  1/4/2021, 2:53 PM

## 2021-01-05 LAB
CULTURE: NORMAL
Lab: NORMAL
SPECIMEN DESCRIPTION: NORMAL

## 2021-05-24 ENCOUNTER — HOSPITAL ENCOUNTER (OUTPATIENT)
Dept: SLEEP CENTER | Age: 61
Discharge: HOME | End: 2021-05-26
Payer: COMMERCIAL

## 2021-05-24 DIAGNOSIS — G47.33 OSA (OBSTRUCTIVE SLEEP APNEA): Primary | ICD-10-CM

## 2021-05-24 PROCEDURE — 95810 POLYSOM 6/> YRS 4/> PARAM: CPT

## 2021-05-25 VITALS
RESPIRATION RATE: 16 BRPM | HEART RATE: 65 BPM | OXYGEN SATURATION: 94 % | HEIGHT: 65 IN | WEIGHT: 249 LBS | BODY MASS INDEX: 41.48 KG/M2

## 2021-06-21 LAB — STATUS: NORMAL

## 2021-07-13 ENCOUNTER — HOSPITAL ENCOUNTER (OUTPATIENT)
Dept: SLEEP CENTER | Age: 61
Discharge: HOME | End: 2021-07-15
Payer: COMMERCIAL

## 2021-07-13 DIAGNOSIS — G47.33 OSA (OBSTRUCTIVE SLEEP APNEA): Primary | ICD-10-CM

## 2021-07-13 PROCEDURE — 95811 POLYSOM 6/>YRS CPAP 4/> PARM: CPT

## 2021-07-14 VITALS
BODY MASS INDEX: 41.48 KG/M2 | OXYGEN SATURATION: 94 % | RESPIRATION RATE: 16 BRPM | WEIGHT: 249 LBS | HEART RATE: 65 BPM | HEIGHT: 65 IN

## 2021-07-14 ASSESSMENT — SLEEP AND FATIGUE QUESTIONNAIRES
HOW LIKELY ARE YOU TO NOD OFF OR FALL ASLEEP WHILE SITTING AND READING: 1
HOW LIKELY ARE YOU TO NOD OFF OR FALL ASLEEP WHILE SITTING INACTIVE IN A PUBLIC PLACE: 1
HOW LIKELY ARE YOU TO NOD OFF OR FALL ASLEEP WHEN YOU ARE A PASSENGER IN A CAR FOR AN HOUR WITHOUT A BREAK: 2
HOW LIKELY ARE YOU TO NOD OFF OR FALL ASLEEP WHILE LYING DOWN TO REST IN THE AFTERNOON WHEN CIRCUMSTANCES PERMIT: 1
HOW LIKELY ARE YOU TO NOD OFF OR FALL ASLEEP IN A CAR, WHILE STOPPED FOR A FEW MINUTES IN TRAFFIC: 0
HOW LIKELY ARE YOU TO NOD OFF OR FALL ASLEEP WHILE SITTING AND TALKING TO SOMEONE: 0
ESS TOTAL SCORE: 7
HOW LIKELY ARE YOU TO NOD OFF OR FALL ASLEEP WHILE SITTING QUIETLY AFTER LUNCH WITHOUT ALCOHOL: 0
HOW LIKELY ARE YOU TO NOD OFF OR FALL ASLEEP WHILE WATCHING TV: 2

## 2021-07-26 LAB — STATUS: NORMAL

## 2023-04-02 ENCOUNTER — HOSPITAL ENCOUNTER (EMERGENCY)
Age: 63
Discharge: HOME | End: 2023-04-02
Attending: EMERGENCY MEDICINE
Payer: COMMERCIAL

## 2023-04-02 VITALS
SYSTOLIC BLOOD PRESSURE: 181 MMHG | OXYGEN SATURATION: 98 % | WEIGHT: 250 LBS | BODY MASS INDEX: 41.6 KG/M2 | RESPIRATION RATE: 16 BRPM | DIASTOLIC BLOOD PRESSURE: 107 MMHG | HEART RATE: 82 BPM | TEMPERATURE: 98.5 F

## 2023-04-02 DIAGNOSIS — S39.012A BACK STRAIN, INITIAL ENCOUNTER: Primary | ICD-10-CM

## 2023-04-02 PROCEDURE — 6370000000 HC RX 637 (ALT 250 FOR IP): Performed by: STUDENT IN AN ORGANIZED HEALTH CARE EDUCATION/TRAINING PROGRAM

## 2023-04-02 PROCEDURE — 99283 EMERGENCY DEPT VISIT LOW MDM: CPT

## 2023-04-02 RX ORDER — ACETAMINOPHEN 500 MG
1000 TABLET ORAL ONCE
Status: COMPLETED | OUTPATIENT
Start: 2023-04-02 | End: 2023-04-02

## 2023-04-02 RX ORDER — LIDOCAINE 4 G/G
1 PATCH TOPICAL DAILY
Status: DISCONTINUED | OUTPATIENT
Start: 2023-04-02 | End: 2023-04-02 | Stop reason: HOSPADM

## 2023-04-02 RX ORDER — ACETAMINOPHEN 500 MG
1000 TABLET ORAL 3 TIMES DAILY
Qty: 42 TABLET | Refills: 0 | Status: SHIPPED | OUTPATIENT
Start: 2023-04-02 | End: 2023-04-09

## 2023-04-02 RX ORDER — LIDOCAINE 50 MG/G
1 PATCH TOPICAL DAILY
Qty: 10 PATCH | Refills: 0 | Status: SHIPPED | OUTPATIENT
Start: 2023-04-02 | End: 2023-04-12

## 2023-04-02 RX ORDER — IBUPROFEN 800 MG/1
800 TABLET ORAL ONCE
Status: COMPLETED | OUTPATIENT
Start: 2023-04-02 | End: 2023-04-02

## 2023-04-02 RX ADMIN — ACETAMINOPHEN 1000 MG: 500 TABLET ORAL at 00:45

## 2023-04-02 RX ADMIN — IBUPROFEN 800 MG: 800 TABLET, FILM COATED ORAL at 00:46

## 2023-04-02 ASSESSMENT — ENCOUNTER SYMPTOMS: BACK PAIN: 1

## 2023-04-02 NOTE — Clinical Note
Ofelia Starr was seen and treated in our emergency department on 4/2/2023. He may return to work on 04/03/2023. If you have any questions or concerns, please don't hesitate to call.       Kevin Griffin MD

## 2023-04-02 NOTE — ED NOTES
Pt presents to the ER via triage ambulatory. Pt c/o of lower back pain. Pt states he was at urgent care and wouldn't give him any more pain medications to  at pharmacy and was told to see PCP on Monday. Pt states he cannot wait until Monday and there's no guarantee his PCP will be able to see him right away. Pt otherwise in NAD, pt A&O x4. Pt is hypertensive, pt states he is compliant with BP medications. Dr. Harrison Just at bedside to further evaluate patient.       Josette Peterson RN  04/02/23 3654

## 2023-04-03 NOTE — ED PROVIDER NOTES
[facility]   Emergency Department  Faculty Attestation       I performed a history and physical examination of the patient and discussed management with the resident. I reviewed the residents note and agree with the documented findings including all diagnostic interpretations and plan of care. Any areas of disagreement are noted on the chart. I was personally present for the key portions of any procedures. I have documented in the chart those procedures where I was not present during the key portions. I have reviewed the emergency nurses triage note. I agree with the chief complaint, past medical history, past surgical history, allergies, medications, social and family history as documented unless otherwise noted below. Documentation of the HPI, Physical Exam and Medical Decision Making performed by [email or web address] is based on my personal performance of the HPI, PE and MDM. For Physician Assistant/ Nurse Practitioner cases/documentation I have personally evaluated this patient and have completed at least one if not all key elements of the E/M (history, physical exam, and MDM). Additional findings are as noted. Pertinent Comments     Primary Care Physician: Simone Coleman MD      ED Triage Vitals [04/02/23 0033]   BP Temp Temp Source Heart Rate Resp SpO2 Height Weight   (!) 181/107 98.5 °F (36.9 °C) Oral 82 16 98 % -- 250 lb (113.4 kg)        This is a 65 y.o. male who presents to the Emergency Department w/ lower back pain that has been progressively worsening for 1 week. Pain is right-sided. Does not radiate down his leg. Has no associated bowel or bladder incontinence. No saddle anesthesia. Patient does have a history of scoliosis since childhood. States that he does know boxes for work and thinks this might of exacerbated. He was seen for this initially given medications including a steroid burst as well as anti-inflammatory. He was also given a shot in the hip per patient.   He states
tobacco: Never   Substance and Sexual Activity    Alcohol use: Not on file     Comment: socially    Drug use: Never    Sexual activity: Not on file   Other Topics Concern    Not on file   Social History Narrative    Not on file     Social Determinants of Health     Financial Resource Strain: Not on file   Food Insecurity: Not on file   Transportation Needs: Not on file   Physical Activity: Not on file   Stress: Not on file   Social Connections: Not on file   Intimate Partner Violence: Not on file   Housing Stability: Not on file       No family history on file. Allergies:  Patient has no known allergies. Home Medications:  Prior to Admission medications    Medication Sig Start Date End Date Taking? Authorizing Provider   acetaminophen (TYLENOL) 500 MG tablet Take 2 tablets by mouth 3 times daily for 7 days 4/2/23 4/9/23 Yes Oly Flynn MD   lidocaine (LIDODERM) 5 % Place 1 patch onto the skin daily for 10 days 12 hours on, 12 hours off. 4/2/23 4/12/23 Yes Oly Flynn MD   LOVASTATIN PO Take 20 mg by mouth     Historical Provider, MD   efavirenz-emtrictabine-tenofovir (ATRIPLA) 600-200-300 MG per tablet Take 1 tablet by mouth nightly  Patient not taking: Reported on 4/2/2023    Historical Provider, MD   metoprolol tartrate (LOPRESSOR) 50 MG tablet Take 50 mg by mouth 2 times daily    Historical Provider, MD   amLODIPine (NORVASC) 5 MG tablet Take 5 mg by mouth daily    Historical Provider, MD         REVIEW OF SYSTEMS       Review of Systems   Constitutional:  Negative for fever. Musculoskeletal:  Positive for back pain. Neurological:  Negative for weakness and numbness. PHYSICAL EXAM      INITIAL VITALS:   BP (!) 181/107   Pulse 82   Temp 98.5 °F (36.9 °C) (Oral)   Resp 16   Wt 250 lb (113.4 kg)   SpO2 98%   BMI 41.60 kg/m²     Physical Exam  Constitutional:       General: He is not in acute distress. Appearance: Normal appearance. He is obese. He is not ill-appearing.    HENT: